# Patient Record
Sex: MALE | Race: ASIAN | Employment: FULL TIME | URBAN - METROPOLITAN AREA
[De-identification: names, ages, dates, MRNs, and addresses within clinical notes are randomized per-mention and may not be internally consistent; named-entity substitution may affect disease eponyms.]

---

## 2019-01-01 ENCOUNTER — APPOINTMENT (OUTPATIENT)
Dept: NON INVASIVE DIAGNOSTICS | Age: 64
DRG: 208 | End: 2019-01-01
Attending: INTERNAL MEDICINE
Payer: COMMERCIAL

## 2019-01-01 ENCOUNTER — APPOINTMENT (OUTPATIENT)
Dept: GENERAL RADIOLOGY | Age: 64
DRG: 208 | End: 2019-01-01
Attending: INTERNAL MEDICINE
Payer: COMMERCIAL

## 2019-01-01 ENCOUNTER — APPOINTMENT (OUTPATIENT)
Dept: GENERAL RADIOLOGY | Age: 64
DRG: 208 | End: 2019-01-01
Attending: EMERGENCY MEDICINE
Payer: COMMERCIAL

## 2019-01-01 ENCOUNTER — HOSPITAL ENCOUNTER (INPATIENT)
Age: 64
LOS: 2 days | DRG: 208 | End: 2019-06-22
Attending: EMERGENCY MEDICINE | Admitting: HOSPITALIST
Payer: COMMERCIAL

## 2019-01-01 ENCOUNTER — APPOINTMENT (OUTPATIENT)
Dept: CT IMAGING | Age: 64
DRG: 208 | End: 2019-01-01
Attending: EMERGENCY MEDICINE
Payer: COMMERCIAL

## 2019-01-01 VITALS
BODY MASS INDEX: 32.76 KG/M2 | HEIGHT: 66 IN | WEIGHT: 203.84 LBS | DIASTOLIC BLOOD PRESSURE: 54 MMHG | SYSTOLIC BLOOD PRESSURE: 74 MMHG | OXYGEN SATURATION: 94 % | TEMPERATURE: 95.5 F

## 2019-01-01 DIAGNOSIS — Z79.01 ANTICOAGULATED: ICD-10-CM

## 2019-01-01 DIAGNOSIS — R57.9 SHOCK CIRCULATORY (HCC): ICD-10-CM

## 2019-01-01 DIAGNOSIS — K92.1 GASTROINTESTINAL HEMORRHAGE WITH MELENA: ICD-10-CM

## 2019-01-01 DIAGNOSIS — D64.9 SEVERE ANEMIA: ICD-10-CM

## 2019-01-01 DIAGNOSIS — D68.9 COAGULOPATHY (HCC): ICD-10-CM

## 2019-01-01 DIAGNOSIS — I21.3: Primary | ICD-10-CM

## 2019-01-01 LAB
ABO + RH BLD: NORMAL
ALBUMIN SERPL-MCNC: 1.8 G/DL (ref 3.4–5)
ALBUMIN SERPL-MCNC: 2.7 G/DL (ref 3.4–5)
ALBUMIN SERPL-MCNC: 3.4 G/DL (ref 3.4–5)
ALBUMIN/GLOB SERPL: 1.1 {RATIO} (ref 0.8–1.7)
ALBUMIN/GLOB SERPL: 1.2 {RATIO} (ref 0.8–1.7)
ALBUMIN/GLOB SERPL: 1.3 {RATIO} (ref 0.8–1.7)
ALP SERPL-CCNC: 37 U/L (ref 45–117)
ALP SERPL-CCNC: 44 U/L (ref 45–117)
ALP SERPL-CCNC: 65 U/L (ref 45–117)
ALT SERPL-CCNC: 27 U/L (ref 16–61)
ALT SERPL-CCNC: 4841 U/L (ref 16–61)
ALT SERPL-CCNC: 997 U/L (ref 16–61)
ANION GAP SERPL CALC-SCNC: 18 MMOL/L (ref 3–18)
ANION GAP SERPL CALC-SCNC: 24 MMOL/L (ref 3–18)
ANION GAP SERPL CALC-SCNC: 26 MMOL/L (ref 3–18)
APPEARANCE UR: CLEAR
APTT PPP: 49.6 SEC (ref 23–36.4)
APTT PPP: 53.1 SEC (ref 23–36.4)
APTT PPP: 58.5 SEC (ref 23–36.4)
ARTERIAL PATENCY WRIST A: ABNORMAL
ARTERIAL PATENCY WRIST A: NO
ARTERIAL PATENCY WRIST A: NO
ARTERIAL PATENCY WRIST A: YES
AST SERPL-CCNC: 1178 U/L (ref 15–37)
AST SERPL-CCNC: 22 U/L (ref 15–37)
AST SERPL-CCNC: 9638 U/L (ref 15–37)
ATRIAL RATE: 119 BPM
ATRIAL RATE: 123 BPM
ATRIAL RATE: 97 BPM
BASE DEFICIT BLD-SCNC: 17 MMOL/L
BASE DEFICIT BLD-SCNC: 17 MMOL/L
BASE DEFICIT BLD-SCNC: 19 MMOL/L
BASE DEFICIT BLD-SCNC: 21 MMOL/L
BASE DEFICIT BLD-SCNC: 24 MMOL/L
BASE DEFICIT BLD-SCNC: 26 MMOL/L
BASOPHILS # BLD: 0 K/UL (ref 0–0.1)
BASOPHILS NFR BLD: 0 % (ref 0–2)
BASOPHILS NFR BLD: 0 % (ref 0–2)
BASOPHILS NFR BLD: 0 % (ref 0–3)
BASOPHILS NFR BLD: 0 % (ref 0–3)
BDY SITE: ABNORMAL
BILIRUB DIRECT SERPL-MCNC: 0.3 MG/DL (ref 0–0.2)
BILIRUB SERPL-MCNC: 0.3 MG/DL (ref 0.2–1)
BILIRUB SERPL-MCNC: 0.4 MG/DL (ref 0.2–1)
BILIRUB SERPL-MCNC: 0.8 MG/DL (ref 0.2–1)
BILIRUB UR QL: NEGATIVE
BLD PROD TYP BPU: NORMAL
BLOOD GROUP ANTIBODIES SERPL: NORMAL
BNP SERPL-MCNC: 539 PG/ML (ref 0–900)
BODY TEMPERATURE: 36.6
BODY TEMPERATURE: 38
BODY TEMPERATURE: 95.7
BPU ID: NORMAL
BUN SERPL-MCNC: 43 MG/DL (ref 7–18)
BUN SERPL-MCNC: 50 MG/DL (ref 7–18)
BUN SERPL-MCNC: 55 MG/DL (ref 7–18)
BUN/CREAT SERPL: 15 (ref 12–20)
BUN/CREAT SERPL: 26 (ref 12–20)
BUN/CREAT SERPL: 41 (ref 12–20)
CA-I SERPL-SCNC: 0.81 MMOL/L (ref 1.12–1.32)
CA-I SERPL-SCNC: 0.85 MMOL/L (ref 1.12–1.32)
CALCIUM SERPL-MCNC: 6 MG/DL (ref 8.5–10.1)
CALCIUM SERPL-MCNC: 6.7 MG/DL (ref 8.5–10.1)
CALCIUM SERPL-MCNC: 8.4 MG/DL (ref 8.5–10.1)
CALCULATED P AXIS, ECG09: 70 DEGREES
CALCULATED R AXIS, ECG10: 59 DEGREES
CALCULATED R AXIS, ECG10: 63 DEGREES
CALCULATED R AXIS, ECG10: 71 DEGREES
CALCULATED T AXIS, ECG11: -109 DEGREES
CALCULATED T AXIS, ECG11: -121 DEGREES
CALCULATED T AXIS, ECG11: -99 DEGREES
CALLED TO:,BCALL1: NORMAL
CHLORIDE SERPL-SCNC: 101 MMOL/L (ref 100–108)
CHLORIDE SERPL-SCNC: 107 MMOL/L (ref 100–108)
CHLORIDE SERPL-SCNC: 99 MMOL/L (ref 100–108)
CK MB CFR SERPL CALC: 10.8 % (ref 0–4)
CK MB CFR SERPL CALC: 5 % (ref 0–4)
CK MB CFR SERPL CALC: 6.9 % (ref 0–4)
CK MB CFR SERPL CALC: 7.1 % (ref 0–4)
CK MB SERPL-MCNC: 30.8 NG/ML (ref 5–25)
CK MB SERPL-MCNC: 4 NG/ML (ref 5–25)
CK MB SERPL-MCNC: 442.7 NG/ML (ref 5–25)
CK MB SERPL-MCNC: 7.7 NG/ML (ref 5–25)
CK SERPL-CCNC: 111 U/L (ref 39–308)
CK SERPL-CCNC: 4106 U/L (ref 39–308)
CK SERPL-CCNC: 432 U/L (ref 39–308)
CK SERPL-CCNC: 80 U/L (ref 39–308)
CO2 SERPL-SCNC: 14 MMOL/L (ref 21–32)
CO2 SERPL-SCNC: 15 MMOL/L (ref 21–32)
CO2 SERPL-SCNC: 16 MMOL/L (ref 21–32)
COLOR UR: YELLOW
CREAT SERPL-MCNC: 1.21 MG/DL (ref 0.6–1.3)
CREAT SERPL-MCNC: 1.68 MG/DL (ref 0.6–1.3)
CREAT SERPL-MCNC: 3.78 MG/DL (ref 0.6–1.3)
CROSSMATCH RESULT,%XM: NORMAL
DIAGNOSIS, 93000: NORMAL
DIFFERENTIAL METHOD BLD: ABNORMAL
ECHO AO ASC DIAM: 2.3 CM
ECHO AO ROOT DIAM: 2.99 CM
ECHO AV AREA PEAK VELOCITY: 1.8 CM2
ECHO AV AREA VTI: 1.6 CM2
ECHO AV AREA/BSA PEAK VELOCITY: 1 CM2/M2
ECHO AV AREA/BSA VTI: 0.9 CM2/M2
ECHO AV MEAN GRADIENT: 2.9 MMHG
ECHO AV PEAK GRADIENT: 5 MMHG
ECHO AV PEAK VELOCITY: 111.82 CM/S
ECHO AV VTI: 18.12 CM
ECHO IVC PROX: 1.79 CM
ECHO LA MAJOR AXIS: 4.73 CM
ECHO LA VOL 2C: 42.34 ML (ref 18–58)
ECHO LA VOL 4C: 50.53 ML (ref 18–58)
ECHO LA VOL BP: 45.53 ML (ref 18–58)
ECHO LA VOL/BSA BIPLANE: 23.01 ML/M2 (ref 16–28)
ECHO LA VOLUME INDEX A2C: 21.4 ML/M2 (ref 16–28)
ECHO LA VOLUME INDEX A4C: 25.54 ML/M2 (ref 16–28)
ECHO LV E' LATERAL VELOCITY: 5 CM/S
ECHO LV E' SEPTAL VELOCITY: 4 CM/S
ECHO LV EDV A2C: 110.6 ML
ECHO LV EDV A2C: 86.2 ML
ECHO LV EDV A4C: 80.7 ML
ECHO LV EDV A4C: 84 ML
ECHO LV EDV BP: 86.1 ML (ref 67–155)
ECHO LV EDV BP: 93.8 ML (ref 67–155)
ECHO LV EDV INDEX A4C: 40.8 ML/M2
ECHO LV EDV INDEX A4C: 42.5 ML/M2
ECHO LV EDV INDEX BP: 43.5 ML/M2
ECHO LV EDV INDEX BP: 47.4 ML/M2
ECHO LV EDV NDEX A2C: 43.6 ML/M2
ECHO LV EDV NDEX A2C: 55.9 ML/M2
ECHO LV EJECTION FRACTION A2C: 26 %
ECHO LV EJECTION FRACTION A2C: 44 %
ECHO LV EJECTION FRACTION A4C: 25 %
ECHO LV EJECTION FRACTION A4C: 43 %
ECHO LV EJECTION FRACTION BIPLANE: 26.8 % (ref 55–100)
ECHO LV EJECTION FRACTION BIPLANE: 43.8 % (ref 55–100)
ECHO LV ESV A2C: 61.8 ML
ECHO LV ESV A2C: 64.1 ML
ECHO LV ESV A4C: 45.9 ML
ECHO LV ESV A4C: 63.3 ML
ECHO LV ESV BP: 52.7 ML (ref 22–58)
ECHO LV ESV BP: 63 ML (ref 22–58)
ECHO LV ESV INDEX A2C: 31.2 ML/M2
ECHO LV ESV INDEX A2C: 32.4 ML/M2
ECHO LV ESV INDEX A4C: 23.2 ML/M2
ECHO LV ESV INDEX A4C: 32 ML/M2
ECHO LV ESV INDEX BP: 26.6 ML/M2
ECHO LV ESV INDEX BP: 31.8 ML/M2
ECHO LV INTERNAL DIMENSION DIASTOLIC: 4.21 CM (ref 4.2–5.9)
ECHO LV INTERNAL DIMENSION DIASTOLIC: 4.43 CM (ref 4.2–5.9)
ECHO LV INTERNAL DIMENSION SYSTOLIC: 3.49 CM
ECHO LV INTERNAL DIMENSION SYSTOLIC: 3.62 CM
ECHO LV IVSD: 1.23 CM (ref 0.6–1)
ECHO LV IVSD: 1.33 CM (ref 0.6–1)
ECHO LV MASS 2D: 227.6 G (ref 88–224)
ECHO LV MASS 2D: 229.1 G (ref 88–224)
ECHO LV MASS INDEX 2D: 115 G/M2 (ref 49–115)
ECHO LV MASS INDEX 2D: 115.8 G/M2 (ref 49–115)
ECHO LV POSTERIOR WALL DIASTOLIC: 1.07 CM (ref 0.6–1)
ECHO LV POSTERIOR WALL DIASTOLIC: 1.31 CM (ref 0.6–1)
ECHO LVOT DIAM: 1.94 CM
ECHO LVOT PEAK GRADIENT: 1.9 MMHG
ECHO LVOT PEAK VELOCITY: 68.72 CM/S
ECHO LVOT VTI: 9.84 CM
ECHO MV A VELOCITY: 66.53 CM/S
ECHO MV AREA PHT: 3.2 CM2
ECHO MV E DECELERATION TIME (DT): 240.8 MS
ECHO MV E VELOCITY: 39.31 CM/S
ECHO MV E/A RATIO: 0.59
ECHO MV E/E' LATERAL: 7.86
ECHO MV E/E' RATIO (AVERAGED): 8.84
ECHO MV E/E' SEPTAL: 9.83
ECHO MV PRESSURE HALF TIME (PHT): 69.8 MS
ECHO PULMONARY ARTERY SYSTOLIC PRESSURE (PASP): 30 MMHG
ECHO RA AREA 4C: 11.95 CM2
ECHO RV INTERNAL DIMENSION: 3.19 CM
ECHO TRICUSPID ANNULAR PEAK SYSTOLIC VELOCITY: 7 CM/S
ECHO TV REGURGITANT MAX VELOCITY: 170.66 CM/S
ECHO TV REGURGITANT PEAK GRADIENT: 11.6 MMHG
EOSINOPHIL # BLD: 0 K/UL (ref 0–0.4)
EOSINOPHIL NFR BLD: 0 % (ref 0–5)
ERYTHROCYTE [DISTWIDTH] IN BLOOD BY AUTOMATED COUNT: 16.2 % (ref 11.6–14.5)
ERYTHROCYTE [DISTWIDTH] IN BLOOD BY AUTOMATED COUNT: 16.3 % (ref 11.6–14.5)
ERYTHROCYTE [DISTWIDTH] IN BLOOD BY AUTOMATED COUNT: 16.6 % (ref 11.6–14.5)
ERYTHROCYTE [DISTWIDTH] IN BLOOD BY AUTOMATED COUNT: 16.7 % (ref 11.6–14.5)
EST. AVERAGE GLUCOSE BLD GHB EST-MCNC: 128 MG/DL
GAS FLOW.O2 O2 DELIVERY SYS: ABNORMAL L/MIN
GAS FLOW.O2 SETTING OXYMISER: 14 BPM
GAS FLOW.O2 SETTING OXYMISER: 20 BPM
GAS FLOW.O2 SETTING OXYMISER: 24 BPM
GLOBULIN SER CALC-MCNC: 1.7 G/DL (ref 2–4)
GLOBULIN SER CALC-MCNC: 2.1 G/DL (ref 2–4)
GLOBULIN SER CALC-MCNC: 2.8 G/DL (ref 2–4)
GLUCOSE BLD STRIP.AUTO-MCNC: 360 MG/DL (ref 70–110)
GLUCOSE BLD STRIP.AUTO-MCNC: 414 MG/DL (ref 70–110)
GLUCOSE BLD STRIP.AUTO-MCNC: 423 MG/DL (ref 70–110)
GLUCOSE BLD STRIP.AUTO-MCNC: 503 MG/DL (ref 70–110)
GLUCOSE SERPL-MCNC: 234 MG/DL (ref 74–99)
GLUCOSE SERPL-MCNC: 289 MG/DL (ref 74–99)
GLUCOSE SERPL-MCNC: 426 MG/DL (ref 74–99)
GLUCOSE UR STRIP.AUTO-MCNC: >1000 MG/DL
H PYLORI IGA SER-ACNC: 14.4 UNITS (ref 0–8.9)
H PYLORI IGG SER IA-ACNC: <0.8 INDEX VALUE (ref 0–0.79)
HBA1C MFR BLD: 6.1 % (ref 4.2–5.6)
HCO3 BLD-SCNC: 10.3 MMOL/L (ref 22–26)
HCO3 BLD-SCNC: 10.3 MMOL/L (ref 22–26)
HCO3 BLD-SCNC: 11.6 MMOL/L (ref 22–26)
HCO3 BLD-SCNC: 12.3 MMOL/L (ref 22–26)
HCO3 BLD-SCNC: 6.3 MMOL/L (ref 22–26)
HCO3 BLD-SCNC: 8.3 MMOL/L (ref 22–26)
HCT VFR BLD AUTO: 20.6 % (ref 36–48)
HCT VFR BLD AUTO: 25.3 % (ref 36–48)
HCT VFR BLD AUTO: 30.6 % (ref 36–48)
HCT VFR BLD AUTO: 30.7 % (ref 36–48)
HCT VFR BLD AUTO: 31.9 % (ref 36–48)
HCT VFR BLD AUTO: 35 % (ref 36–48)
HEMOCCULT STL QL: POSITIVE
HGB BLD-MCNC: 10.3 G/DL (ref 13–16)
HGB BLD-MCNC: 11.4 G/DL (ref 13–16)
HGB BLD-MCNC: 6.4 G/DL (ref 13–16)
HGB BLD-MCNC: 8 G/DL (ref 13–16)
HGB BLD-MCNC: 9.8 G/DL (ref 13–16)
HGB BLD-MCNC: 9.9 G/DL (ref 13–16)
HGB UR QL STRIP: NEGATIVE
INR PPP: 1.7 (ref 0.8–1.2)
INR PPP: 2.7 (ref 0.8–1.2)
INR PPP: 3 (ref 0.8–1.2)
INSPIRATION.DURATION SETTING TIME VENT: 1.1 SEC
INSPIRATION.DURATION SETTING TIME VENT: 1.2 SEC
INSPIRATION.DURATION SETTING TIME VENT: 1.5 SEC
KETONES UR QL STRIP.AUTO: 15 MG/DL
LACTATE BLD-SCNC: 4.1 MMOL/L (ref 0.4–2)
LACTATE SERPL-SCNC: 15.3 MMOL/L (ref 0.4–2)
LACTATE SERPL-SCNC: 17 MMOL/L (ref 0.4–2)
LACTATE SERPL-SCNC: 17.9 MMOL/L (ref 0.4–2)
LEUKOCYTE ESTERASE UR QL STRIP.AUTO: NEGATIVE
LYMPHOCYTES # BLD: 1.3 K/UL (ref 0.8–3.5)
LYMPHOCYTES # BLD: 3 K/UL (ref 0.9–3.6)
LYMPHOCYTES # BLD: 3.2 K/UL (ref 0.8–3.5)
LYMPHOCYTES # BLD: 3.8 K/UL (ref 0.9–3.6)
LYMPHOCYTES NFR BLD: 14 % (ref 21–52)
LYMPHOCYTES NFR BLD: 16 % (ref 20–51)
LYMPHOCYTES NFR BLD: 18 % (ref 21–52)
LYMPHOCYTES NFR BLD: 6 % (ref 20–51)
MAGNESIUM SERPL-MCNC: 2.4 MG/DL (ref 1.6–2.6)
MCH RBC QN AUTO: 26.8 PG (ref 24–34)
MCH RBC QN AUTO: 27.1 PG (ref 24–34)
MCH RBC QN AUTO: 29.4 PG (ref 24–34)
MCH RBC QN AUTO: 29.5 PG (ref 24–34)
MCHC RBC AUTO-ENTMCNC: 31.1 G/DL (ref 31–37)
MCHC RBC AUTO-ENTMCNC: 31.6 G/DL (ref 31–37)
MCHC RBC AUTO-ENTMCNC: 32.2 G/DL (ref 31–37)
MCHC RBC AUTO-ENTMCNC: 32.3 G/DL (ref 31–37)
MCV RBC AUTO: 84.6 FL (ref 74–97)
MCV RBC AUTO: 87.3 FL (ref 74–97)
MCV RBC AUTO: 91.1 FL (ref 74–97)
MCV RBC AUTO: 91.4 FL (ref 74–97)
METAMYELOCYTES NFR BLD MANUAL: 1 %
MONOCYTES # BLD: 0.6 K/UL (ref 0–1)
MONOCYTES # BLD: 0.8 K/UL (ref 0.05–1.2)
MONOCYTES # BLD: 0.8 K/UL (ref 0–1)
MONOCYTES # BLD: 1.1 K/UL (ref 0.05–1.2)
MONOCYTES NFR BLD: 3 % (ref 2–9)
MONOCYTES NFR BLD: 4 % (ref 2–9)
MONOCYTES NFR BLD: 4 % (ref 3–10)
MONOCYTES NFR BLD: 5 % (ref 3–10)
MYELOCYTES NFR BLD MANUAL: 4 %
NEUTS BAND NFR BLD MANUAL: 13 % (ref 0–5)
NEUTS SEG # BLD: 15.2 K/UL (ref 1.8–8)
NEUTS SEG # BLD: 16.8 K/UL (ref 1.8–8)
NEUTS SEG # BLD: 16.8 K/UL (ref 1.8–8)
NEUTS SEG # BLD: 16.9 K/UL (ref 1.8–8)
NEUTS SEG NFR BLD: 75 % (ref 42–75)
NEUTS SEG NFR BLD: 78 % (ref 40–73)
NEUTS SEG NFR BLD: 78 % (ref 42–75)
NEUTS SEG NFR BLD: 81 % (ref 40–73)
NITRITE UR QL STRIP.AUTO: NEGATIVE
NRBC BLD-RTO: 3 PER 100 WBC
O2/TOTAL GAS SETTING VFR VENT: 100 %
O2/TOTAL GAS SETTING VFR VENT: 40 %
P-R INTERVAL, ECG05: 152 MS
P-R INTERVAL, ECG05: 186 MS
PCO2 BLD: 28.7 MMHG (ref 35–45)
PCO2 BLD: 32.8 MMHG (ref 35–45)
PCO2 BLD: 34.5 MMHG (ref 35–45)
PCO2 BLD: 38 MMHG (ref 35–45)
PCO2 BLD: 38.6 MMHG (ref 35–45)
PCO2 BLD: 41.8 MMHG (ref 35–45)
PEEP RESPIRATORY: 5 CMH2O
PEEP RESPIRATORY: 8 CMH2O
PH BLD: 6.91 [PH] (ref 7.35–7.45)
PH BLD: 6.95 [PH] (ref 7.35–7.45)
PH BLD: 7.03 [PH] (ref 7.35–7.45)
PH BLD: 7.1 [PH] (ref 7.35–7.45)
PH BLD: 7.12 [PH] (ref 7.35–7.45)
PH BLD: 7.14 [PH] (ref 7.35–7.45)
PH UR STRIP: 5 [PH] (ref 5–8)
PHOSPHATE SERPL-MCNC: 9 MG/DL (ref 2.5–4.9)
PHOSPHATE SERPL-MCNC: 9.3 MG/DL (ref 2.5–4.9)
PLATELET # BLD AUTO: 206 K/UL (ref 135–420)
PLATELET # BLD AUTO: 259 K/UL (ref 135–420)
PLATELET # BLD AUTO: 64 K/UL (ref 135–420)
PLATELET # BLD AUTO: 72 K/UL (ref 135–420)
PLATELET COMMENTS,PCOM: ABNORMAL
PLATELET COMMENTS,PCOM: ABNORMAL
PMV BLD AUTO: 11.9 FL (ref 9.2–11.8)
PMV BLD AUTO: 12.3 FL (ref 9.2–11.8)
PMV BLD AUTO: 12.4 FL (ref 9.2–11.8)
PMV BLD AUTO: 13 FL (ref 9.2–11.8)
PO2 BLD: 100 MMHG (ref 80–100)
PO2 BLD: 118 MMHG (ref 80–100)
PO2 BLD: 179 MMHG (ref 80–100)
PO2 BLD: 69 MMHG (ref 80–100)
PO2 BLD: 80 MMHG (ref 80–100)
PO2 BLD: 98 MMHG (ref 80–100)
POTASSIUM SERPL-SCNC: 4.2 MMOL/L (ref 3.5–5.5)
POTASSIUM SERPL-SCNC: 4.7 MMOL/L (ref 3.5–5.5)
POTASSIUM SERPL-SCNC: 4.8 MMOL/L (ref 3.5–5.5)
PROT SERPL-MCNC: 3.5 G/DL (ref 6.4–8.2)
PROT SERPL-MCNC: 4.8 G/DL (ref 6.4–8.2)
PROT SERPL-MCNC: 6.2 G/DL (ref 6.4–8.2)
PROT UR STRIP-MCNC: NEGATIVE MG/DL
PROTHROMBIN TIME: 20 SEC (ref 11.5–15.2)
PROTHROMBIN TIME: 29.2 SEC (ref 11.5–15.2)
PROTHROMBIN TIME: 31.8 SEC (ref 11.5–15.2)
Q-T INTERVAL, ECG07: 322 MS
Q-T INTERVAL, ECG07: 344 MS
Q-T INTERVAL, ECG07: 360 MS
QRS DURATION, ECG06: 104 MS
QRS DURATION, ECG06: 72 MS
QRS DURATION, ECG06: 82 MS
QTC CALCULATION (BEZET), ECG08: 457 MS
QTC CALCULATION (BEZET), ECG08: 462 MS
QTC CALCULATION (BEZET), ECG08: 492 MS
RBC # BLD AUTO: 2.36 M/UL (ref 4.7–5.5)
RBC # BLD AUTO: 2.99 M/UL (ref 4.7–5.5)
RBC # BLD AUTO: 3.36 M/UL (ref 4.7–5.5)
RBC # BLD AUTO: 3.5 M/UL (ref 4.7–5.5)
RBC MORPH BLD: ABNORMAL
SAO2 % BLD: 86 % (ref 92–97)
SAO2 % BLD: 91 % (ref 92–97)
SAO2 % BLD: 92 % (ref 92–97)
SAO2 % BLD: 95 % (ref 92–97)
SAO2 % BLD: 95 % (ref 92–97)
SAO2 % BLD: 99 % (ref 92–97)
SERVICE CMNT-IMP: ABNORMAL
SODIUM SERPL-SCNC: 135 MMOL/L (ref 136–145)
SODIUM SERPL-SCNC: 139 MMOL/L (ref 136–145)
SODIUM SERPL-SCNC: 146 MMOL/L (ref 136–145)
SP GR UR REFRACTOMETRY: 1.03 (ref 1–1.03)
SPECIMEN EXP DATE BLD: NORMAL
SPECIMEN TYPE: ABNORMAL
STATUS OF UNIT,%ST: NORMAL
TOTAL RESP. RATE, ITRR: 18
TOTAL RESP. RATE, ITRR: 24
TOTAL RESP. RATE, ITRR: 24
TROPONIN I SERPL-MCNC: 0.21 NG/ML (ref 0–0.04)
TROPONIN I SERPL-MCNC: 0.75 NG/ML (ref 0–0.04)
TROPONIN I SERPL-MCNC: 11.1 NG/ML (ref 0–0.04)
TROPONIN I SERPL-MCNC: >200 NG/ML (ref 0–0.04)
UNIT DIVISION, %UDIV: 0
UROBILINOGEN UR QL STRIP.AUTO: 0.2 EU/DL (ref 0.2–1)
VENTILATION MODE VENT: ABNORMAL
VENTRICULAR RATE, ECG03: 123 BPM
VENTRICULAR RATE, ECG03: 124 BPM
VENTRICULAR RATE, ECG03: 97 BPM
VOLUME CONTROL IVLC: YES
VOLUME CONTROL PLUS IVLCP: YES
VOLUME CONTROL PLUS IVLCP: YES
VT SETTING VENT: 450 ML
WBC # BLD AUTO: 20.3 K/UL (ref 4.6–13.2)
WBC # BLD AUTO: 21 K/UL (ref 4.6–13.2)
WBC # BLD AUTO: 21.5 K/UL (ref 4.6–13.2)
WBC # BLD AUTO: 21.6 K/UL (ref 4.6–13.2)

## 2019-01-01 PROCEDURE — 96368 THER/DIAG CONCURRENT INF: CPT

## 2019-01-01 PROCEDURE — 94400 HC END TIDAL CO2 RESPONSE CURVE: CPT

## 2019-01-01 PROCEDURE — 04HK33Z INSERTION OF INFUSION DEVICE INTO RIGHT FEMORAL ARTERY, PERCUTANEOUS APPROACH: ICD-10-PCS | Performed by: INTERNAL MEDICINE

## 2019-01-01 PROCEDURE — C1751 CATH, INF, PER/CENT/MIDLINE: HCPCS

## 2019-01-01 PROCEDURE — 86923 COMPATIBILITY TEST ELECTRIC: CPT

## 2019-01-01 PROCEDURE — P9059 PLASMA, FRZ BETWEEN 8-24HOUR: HCPCS

## 2019-01-01 PROCEDURE — 77030034850

## 2019-01-01 PROCEDURE — 36592 COLLECT BLOOD FROM PICC: CPT

## 2019-01-01 PROCEDURE — 74011000258 HC RX REV CODE- 258: Performed by: EMERGENCY MEDICINE

## 2019-01-01 PROCEDURE — 30233N1 TRANSFUSION OF NONAUTOLOGOUS RED BLOOD CELLS INTO PERIPHERAL VEIN, PERCUTANEOUS APPROACH: ICD-10-PCS | Performed by: HOSPITALIST

## 2019-01-01 PROCEDURE — 74011636320 HC RX REV CODE- 636/320: Performed by: EMERGENCY MEDICINE

## 2019-01-01 PROCEDURE — 94002 VENT MGMT INPAT INIT DAY: CPT

## 2019-01-01 PROCEDURE — 80048 BASIC METABOLIC PNL TOTAL CA: CPT

## 2019-01-01 PROCEDURE — 74011000250 HC RX REV CODE- 250: Performed by: INTERNAL MEDICINE

## 2019-01-01 PROCEDURE — 85610 PROTHROMBIN TIME: CPT

## 2019-01-01 PROCEDURE — 74011000258 HC RX REV CODE- 258: Performed by: INTERNAL MEDICINE

## 2019-01-01 PROCEDURE — 36620 INSERTION CATHETER ARTERY: CPT

## 2019-01-01 PROCEDURE — 71045 X-RAY EXAM CHEST 1 VIEW: CPT

## 2019-01-01 PROCEDURE — 36600 WITHDRAWAL OF ARTERIAL BLOOD: CPT

## 2019-01-01 PROCEDURE — 77030005402 HC CATH RAD ART LN KT TELE -B

## 2019-01-01 PROCEDURE — 74011250636 HC RX REV CODE- 250/636

## 2019-01-01 PROCEDURE — 82962 GLUCOSE BLOOD TEST: CPT

## 2019-01-01 PROCEDURE — 80053 COMPREHEN METABOLIC PANEL: CPT

## 2019-01-01 PROCEDURE — 74011250636 HC RX REV CODE- 250/636: Performed by: EMERGENCY MEDICINE

## 2019-01-01 PROCEDURE — 30233K1 TRANSFUSION OF NONAUTOLOGOUS FROZEN PLASMA INTO PERIPHERAL VEIN, PERCUTANEOUS APPROACH: ICD-10-PCS | Performed by: HOSPITALIST

## 2019-01-01 PROCEDURE — P9047 ALBUMIN (HUMAN), 25%, 50ML: HCPCS | Performed by: INTERNAL MEDICINE

## 2019-01-01 PROCEDURE — 86900 BLOOD TYPING SEROLOGIC ABO: CPT

## 2019-01-01 PROCEDURE — 74011000636 HC RX REV CODE- 636: Performed by: EMERGENCY MEDICINE

## 2019-01-01 PROCEDURE — 77010033678 HC OXYGEN DAILY

## 2019-01-01 PROCEDURE — 81003 URINALYSIS AUTO W/O SCOPE: CPT

## 2019-01-01 PROCEDURE — 82803 BLOOD GASES ANY COMBINATION: CPT

## 2019-01-01 PROCEDURE — 80076 HEPATIC FUNCTION PANEL: CPT

## 2019-01-01 PROCEDURE — 74011250636 HC RX REV CODE- 250/636: Performed by: INTERNAL MEDICINE

## 2019-01-01 PROCEDURE — 65610000006 HC RM INTENSIVE CARE

## 2019-01-01 PROCEDURE — 31500 INSERT EMERGENCY AIRWAY: CPT

## 2019-01-01 PROCEDURE — 95816 EEG AWAKE AND DROWSY: CPT | Performed by: INTERNAL MEDICINE

## 2019-01-01 PROCEDURE — P9016 RBC LEUKOCYTES REDUCED: HCPCS

## 2019-01-01 PROCEDURE — 93005 ELECTROCARDIOGRAM TRACING: CPT

## 2019-01-01 PROCEDURE — 74011000250 HC RX REV CODE- 250: Performed by: EMERGENCY MEDICINE

## 2019-01-01 PROCEDURE — 93306 TTE W/DOPPLER COMPLETE: CPT

## 2019-01-01 PROCEDURE — 5A1945Z RESPIRATORY VENTILATION, 24-96 CONSECUTIVE HOURS: ICD-10-PCS | Performed by: EMERGENCY MEDICINE

## 2019-01-01 PROCEDURE — 87077 CULTURE AEROBIC IDENTIFY: CPT

## 2019-01-01 PROCEDURE — 84100 ASSAY OF PHOSPHORUS: CPT

## 2019-01-01 PROCEDURE — 96375 TX/PRO/DX INJ NEW DRUG ADDON: CPT

## 2019-01-01 PROCEDURE — 36556 INSERT NON-TUNNEL CV CATH: CPT

## 2019-01-01 PROCEDURE — 77030020454 HC TU ET CUF HI/LO COVD -B

## 2019-01-01 PROCEDURE — 87040 BLOOD CULTURE FOR BACTERIA: CPT

## 2019-01-01 PROCEDURE — 74177 CT ABD & PELVIS W/CONTRAST: CPT

## 2019-01-01 PROCEDURE — 83605 ASSAY OF LACTIC ACID: CPT

## 2019-01-01 PROCEDURE — C9113 INJ PANTOPRAZOLE SODIUM, VIA: HCPCS | Performed by: INTERNAL MEDICINE

## 2019-01-01 PROCEDURE — 36430 TRANSFUSION BLD/BLD COMPNT: CPT

## 2019-01-01 PROCEDURE — 85025 COMPLETE CBC W/AUTO DIFF WBC: CPT

## 2019-01-01 PROCEDURE — 82272 OCCULT BLD FECES 1-3 TESTS: CPT

## 2019-01-01 PROCEDURE — 92950 HEART/LUNG RESUSCITATION CPR: CPT

## 2019-01-01 PROCEDURE — 99285 EMERGENCY DEPT VISIT HI MDM: CPT

## 2019-01-01 PROCEDURE — 85730 THROMBOPLASTIN TIME PARTIAL: CPT

## 2019-01-01 PROCEDURE — 74011250637 HC RX REV CODE- 250/637: Performed by: EMERGENCY MEDICINE

## 2019-01-01 PROCEDURE — 02HV33Z INSERTION OF INFUSION DEVICE INTO SUPERIOR VENA CAVA, PERCUTANEOUS APPROACH: ICD-10-PCS | Performed by: EMERGENCY MEDICINE

## 2019-01-01 PROCEDURE — C9132 KCENTRA, PER I.U.: HCPCS | Performed by: EMERGENCY MEDICINE

## 2019-01-01 PROCEDURE — 77030008477 HC STYL SATN SLP COVD -A

## 2019-01-01 PROCEDURE — 30283B1 TRANSFUSION OF NONAUTOLOGOUS 4-FACTOR PROTHROMBIN COMPLEX CONCENTRATE INTO VEIN, PERCUTANEOUS APPROACH: ICD-10-PCS | Performed by: EMERGENCY MEDICINE

## 2019-01-01 PROCEDURE — 87186 SC STD MICRODIL/AGAR DIL: CPT

## 2019-01-01 PROCEDURE — 76450000000

## 2019-01-01 PROCEDURE — 96366 THER/PROPH/DIAG IV INF ADDON: CPT

## 2019-01-01 PROCEDURE — 94003 VENT MGMT INPAT SUBQ DAY: CPT

## 2019-01-01 PROCEDURE — 74018 RADEX ABDOMEN 1 VIEW: CPT

## 2019-01-01 PROCEDURE — 85018 HEMOGLOBIN: CPT

## 2019-01-01 PROCEDURE — 86677 HELICOBACTER PYLORI ANTIBODY: CPT

## 2019-01-01 PROCEDURE — 83735 ASSAY OF MAGNESIUM: CPT

## 2019-01-01 PROCEDURE — C9113 INJ PANTOPRAZOLE SODIUM, VIA: HCPCS | Performed by: EMERGENCY MEDICINE

## 2019-01-01 PROCEDURE — 83036 HEMOGLOBIN GLYCOSYLATED A1C: CPT

## 2019-01-01 PROCEDURE — 0BH17EZ INSERTION OF ENDOTRACHEAL AIRWAY INTO TRACHEA, VIA NATURAL OR ARTIFICIAL OPENING: ICD-10-PCS | Performed by: EMERGENCY MEDICINE

## 2019-01-01 PROCEDURE — 82330 ASSAY OF CALCIUM: CPT

## 2019-01-01 PROCEDURE — 74011636637 HC RX REV CODE- 636/637: Performed by: INTERNAL MEDICINE

## 2019-01-01 PROCEDURE — 74011000250 HC RX REV CODE- 250

## 2019-01-01 PROCEDURE — 82550 ASSAY OF CK (CPK): CPT

## 2019-01-01 PROCEDURE — 83880 ASSAY OF NATRIURETIC PEPTIDE: CPT

## 2019-01-01 PROCEDURE — 93308 TTE F-UP OR LMTD: CPT

## 2019-01-01 PROCEDURE — 96365 THER/PROPH/DIAG IV INF INIT: CPT

## 2019-01-01 RX ORDER — DOPAMINE HYDROCHLORIDE 160 MG/100ML
INJECTION, SOLUTION INTRAVENOUS
Status: COMPLETED
Start: 2019-01-01 | End: 2019-01-01

## 2019-01-01 RX ORDER — NOREPINEPHRINE BITARTRATE/D5W 8 MG/250ML
2-16 PLASTIC BAG, INJECTION (ML) INTRAVENOUS
Status: DISCONTINUED | OUTPATIENT
Start: 2019-01-01 | End: 2019-01-01

## 2019-01-01 RX ORDER — SODIUM BICARBONATE 84 MG/ML
100 INJECTION, SOLUTION INTRAVENOUS ONCE
Status: DISPENSED | OUTPATIENT
Start: 2019-01-01 | End: 2019-01-01

## 2019-01-01 RX ORDER — SODIUM BICARBONATE 84 MG/ML
100 INJECTION, SOLUTION INTRAVENOUS ONCE
Status: COMPLETED | OUTPATIENT
Start: 2019-01-01 | End: 2019-01-01

## 2019-01-01 RX ORDER — DOPAMINE HYDROCHLORIDE 160 MG/100ML
5-20 INJECTION, SOLUTION INTRAVENOUS
Status: DISCONTINUED | OUTPATIENT
Start: 2019-01-01 | End: 2019-01-01 | Stop reason: HOSPADM

## 2019-01-01 RX ORDER — DEXTROSE MONOHYDRATE 100 MG/ML
125-250 INJECTION, SOLUTION INTRAVENOUS AS NEEDED
Status: DISCONTINUED | OUTPATIENT
Start: 2019-01-01 | End: 2019-01-01 | Stop reason: HOSPADM

## 2019-01-01 RX ORDER — SODIUM CHLORIDE 9 MG/ML
250 INJECTION, SOLUTION INTRAVENOUS AS NEEDED
Status: DISCONTINUED | OUTPATIENT
Start: 2019-01-01 | End: 2019-01-01 | Stop reason: HOSPADM

## 2019-01-01 RX ORDER — CHLORHEXIDINE GLUCONATE 1.2 MG/ML
10 RINSE ORAL EVERY 12 HOURS
Status: DISCONTINUED | OUTPATIENT
Start: 2019-01-01 | End: 2019-01-01 | Stop reason: HOSPADM

## 2019-01-01 RX ORDER — LORAZEPAM 2 MG/ML
2 INJECTION INTRAMUSCULAR ONCE
Status: COMPLETED | OUTPATIENT
Start: 2019-01-01 | End: 2019-01-01

## 2019-01-01 RX ORDER — NOREPINEPHRINE BITARTRATE 1 MG/ML
8 INJECTION, SOLUTION INTRAVENOUS
Status: DISCONTINUED | OUTPATIENT
Start: 2019-01-01 | End: 2019-01-01

## 2019-01-01 RX ORDER — MORPHINE SULFATE 2 MG/ML
2 INJECTION, SOLUTION INTRAMUSCULAR; INTRAVENOUS ONCE
Status: ACTIVE | OUTPATIENT
Start: 2019-01-01 | End: 2019-01-01

## 2019-01-01 RX ORDER — ETOMIDATE 2 MG/ML
INJECTION INTRAVENOUS
Status: COMPLETED | OUTPATIENT
Start: 2019-01-01 | End: 2019-01-01

## 2019-01-01 RX ORDER — PHYTONADIONE 10 MG/ML
2.5 INJECTION, EMULSION INTRAMUSCULAR; INTRAVENOUS; SUBCUTANEOUS ONCE
Status: ACTIVE | OUTPATIENT
Start: 2019-01-01 | End: 2019-01-01

## 2019-01-01 RX ORDER — HYDROCORTISONE SODIUM SUCCINATE 100 MG/2ML
50 INJECTION, POWDER, FOR SOLUTION INTRAMUSCULAR; INTRAVENOUS EVERY 6 HOURS
Status: DISCONTINUED | OUTPATIENT
Start: 2019-01-01 | End: 2019-01-01 | Stop reason: HOSPADM

## 2019-01-01 RX ORDER — SODIUM CHLORIDE 9 MG/ML
75 INJECTION, SOLUTION INTRAVENOUS CONTINUOUS
Status: DISCONTINUED | OUTPATIENT
Start: 2019-01-01 | End: 2019-01-01

## 2019-01-01 RX ORDER — FENTANYL CITRATE 50 UG/ML
50 INJECTION, SOLUTION INTRAMUSCULAR; INTRAVENOUS
Status: DISCONTINUED | OUTPATIENT
Start: 2019-01-01 | End: 2019-01-01 | Stop reason: HOSPADM

## 2019-01-01 RX ORDER — LORAZEPAM 2 MG/ML
1 INJECTION INTRAMUSCULAR ONCE
Status: COMPLETED | OUTPATIENT
Start: 2019-01-01 | End: 2019-01-01

## 2019-01-01 RX ORDER — LEVOFLOXACIN 5 MG/ML
750 INJECTION, SOLUTION INTRAVENOUS EVERY 24 HOURS
Status: DISCONTINUED | OUTPATIENT
Start: 2019-01-01 | End: 2019-01-01

## 2019-01-01 RX ORDER — SODIUM BICARBONATE 1 MEQ/ML
100 SYRINGE (ML) INTRAVENOUS ONCE
Status: COMPLETED | OUTPATIENT
Start: 2019-01-01 | End: 2019-01-01

## 2019-01-01 RX ORDER — MAGNESIUM SULFATE 100 %
4 CRYSTALS MISCELLANEOUS AS NEEDED
Status: DISCONTINUED | OUTPATIENT
Start: 2019-01-01 | End: 2019-01-01 | Stop reason: HOSPADM

## 2019-01-01 RX ORDER — MORPHINE SULFATE 2 MG/ML
2 INJECTION, SOLUTION INTRAMUSCULAR; INTRAVENOUS
Status: DISPENSED | OUTPATIENT
Start: 2019-01-01 | End: 2019-01-01

## 2019-01-01 RX ORDER — INSULIN LISPRO 100 [IU]/ML
INJECTION, SOLUTION INTRAVENOUS; SUBCUTANEOUS EVERY 6 HOURS
Status: DISCONTINUED | OUTPATIENT
Start: 2019-01-01 | End: 2019-01-01

## 2019-01-01 RX ORDER — EPINEPHRINE 0.1 MG/ML
INJECTION INTRACARDIAC; INTRAVENOUS
Status: COMPLETED | OUTPATIENT
Start: 2019-01-01 | End: 2019-01-01

## 2019-01-01 RX ORDER — ONDANSETRON 2 MG/ML
4 INJECTION INTRAMUSCULAR; INTRAVENOUS
Status: COMPLETED | OUTPATIENT
Start: 2019-01-01 | End: 2019-01-01

## 2019-01-01 RX ORDER — PANTOPRAZOLE SODIUM 40 MG/10ML
80 INJECTION, POWDER, LYOPHILIZED, FOR SOLUTION INTRAVENOUS
Status: COMPLETED | OUTPATIENT
Start: 2019-01-01 | End: 2019-01-01

## 2019-01-01 RX ORDER — SUCCINYLCHOLINE CHLORIDE 20 MG/ML
INJECTION INTRAMUSCULAR; INTRAVENOUS
Status: COMPLETED | OUTPATIENT
Start: 2019-01-01 | End: 2019-01-01

## 2019-01-01 RX ORDER — SODIUM BICARBONATE 1 MEQ/ML
SYRINGE (ML) INTRAVENOUS
Status: COMPLETED | OUTPATIENT
Start: 2019-01-01 | End: 2019-01-01

## 2019-01-01 RX ORDER — NITROGLYCERIN 0.4 MG/1
0.4 TABLET SUBLINGUAL AS NEEDED
Status: DISCONTINUED | OUTPATIENT
Start: 2019-01-01 | End: 2019-01-01 | Stop reason: HOSPADM

## 2019-01-01 RX ORDER — ALBUMIN HUMAN 250 G/1000ML
12.5 SOLUTION INTRAVENOUS EVERY 6 HOURS
Status: DISCONTINUED | OUTPATIENT
Start: 2019-01-01 | End: 2019-01-01 | Stop reason: HOSPADM

## 2019-01-01 RX ORDER — DEXTROSE MONOHYDRATE 100 MG/ML
100 INJECTION, SOLUTION INTRAVENOUS AS NEEDED
Status: DISCONTINUED | OUTPATIENT
Start: 2019-01-01 | End: 2019-01-01 | Stop reason: HOSPADM

## 2019-01-01 RX ORDER — ACETAMINOPHEN 325 MG/1
650 TABLET ORAL
Status: COMPLETED | OUTPATIENT
Start: 2019-01-01 | End: 2019-01-01

## 2019-01-01 RX ORDER — SODIUM CHLORIDE 0.9 % (FLUSH) 0.9 %
5-10 SYRINGE (ML) INJECTION AS NEEDED
Status: DISCONTINUED | OUTPATIENT
Start: 2019-01-01 | End: 2019-01-01 | Stop reason: HOSPADM

## 2019-01-01 RX ORDER — SODIUM BICARBONATE 1 MEQ/ML
SYRINGE (ML) INTRAVENOUS
Status: COMPLETED
Start: 2019-01-01 | End: 2019-01-01

## 2019-01-01 RX ORDER — FAMOTIDINE 10 MG/ML
20 INJECTION INTRAVENOUS
Status: COMPLETED | OUTPATIENT
Start: 2019-01-01 | End: 2019-01-01

## 2019-01-01 RX ORDER — VANCOMYCIN/0.9 % SOD CHLORIDE 1.5G/250ML
1500 PLASTIC BAG, INJECTION (ML) INTRAVENOUS ONCE
Status: COMPLETED | OUTPATIENT
Start: 2019-01-01 | End: 2019-01-01

## 2019-01-01 RX ORDER — HYDROCORTISONE SODIUM SUCCINATE 100 MG/2ML
100 INJECTION, POWDER, FOR SOLUTION INTRAMUSCULAR; INTRAVENOUS ONCE
Status: COMPLETED | OUTPATIENT
Start: 2019-01-01 | End: 2019-01-01

## 2019-01-01 RX ADMIN — SODIUM CHLORIDE 40 MG: 9 INJECTION, SOLUTION INTRAMUSCULAR; INTRAVENOUS; SUBCUTANEOUS at 10:50

## 2019-01-01 RX ADMIN — MEROPENEM 1 G: 1 INJECTION, POWDER, FOR SOLUTION INTRAVENOUS at 18:38

## 2019-01-01 RX ADMIN — ETOMIDATE 20 MG: 2 INJECTION, SOLUTION INTRAVENOUS at 07:13

## 2019-01-01 RX ADMIN — VANCOMYCIN HYDROCHLORIDE 1000 MG: 1 INJECTION, POWDER, LYOPHILIZED, FOR SOLUTION INTRAVENOUS at 19:06

## 2019-01-01 RX ADMIN — SODIUM BICARBONATE 50 MEQ: 84 INJECTION, SOLUTION INTRAVENOUS at 10:19

## 2019-01-01 RX ADMIN — IOPAMIDOL 100 ML: 612 INJECTION, SOLUTION INTRAVENOUS at 06:33

## 2019-01-01 RX ADMIN — DOPAMINE HYDROCHLORIDE 10 MCG/KG/MIN: 160 INJECTION, SOLUTION INTRAVENOUS at 10:45

## 2019-01-01 RX ADMIN — VANCOMYCIN HYDROCHLORIDE 1500 MG: 10 INJECTION, POWDER, LYOPHILIZED, FOR SOLUTION INTRAVENOUS at 05:36

## 2019-01-01 RX ADMIN — MEROPENEM 1 G: 1 INJECTION, POWDER, FOR SOLUTION INTRAVENOUS at 10:07

## 2019-01-01 RX ADMIN — HYDROCORTISONE SODIUM SUCCINATE 100 MG: 100 INJECTION, POWDER, FOR SOLUTION INTRAMUSCULAR; INTRAVENOUS at 10:26

## 2019-01-01 RX ADMIN — SODIUM BICARBONATE: 84 INJECTION, SOLUTION INTRAVENOUS at 11:08

## 2019-01-01 RX ADMIN — Medication 4425 INT'L UNITS: at 08:54

## 2019-01-01 RX ADMIN — ALBUMIN (HUMAN) 12.5 G: 0.25 INJECTION, SOLUTION INTRAVENOUS at 23:23

## 2019-01-01 RX ADMIN — PHENYLEPHRINE HYDROCHLORIDE 35 MCG/MIN: 10 INJECTION INTRAVENOUS at 09:53

## 2019-01-01 RX ADMIN — DOPAMINE HYDROCHLORIDE 20 MCG/KG/MIN: 160 INJECTION, SOLUTION INTRAVENOUS at 11:05

## 2019-01-01 RX ADMIN — PROMETHAZINE HYDROCHLORIDE 25 MG: 25 INJECTION, SOLUTION INTRAMUSCULAR; INTRAVENOUS at 05:54

## 2019-01-01 RX ADMIN — EPINEPHRINE 1 MG: 0.1 INJECTION, SOLUTION ENDOTRACHEAL; INTRACARDIAC; INTRAVENOUS at 10:26

## 2019-01-01 RX ADMIN — ONDANSETRON 4 MG: 2 INJECTION INTRAMUSCULAR; INTRAVENOUS at 03:58

## 2019-01-01 RX ADMIN — CALCIUM GLUCONATE 1 G: 94 INJECTION, SOLUTION INTRAVENOUS at 15:59

## 2019-01-01 RX ADMIN — SODIUM BICARBONATE 50 MEQ: 84 INJECTION INTRAVENOUS at 11:27

## 2019-01-01 RX ADMIN — PHENYLEPHRINE HYDROCHLORIDE 200 MCG/MIN: 10 INJECTION INTRAVENOUS at 21:30

## 2019-01-01 RX ADMIN — HYDROCORTISONE SODIUM SUCCINATE 50 MG: 100 INJECTION, POWDER, FOR SOLUTION INTRAMUSCULAR; INTRAVENOUS at 00:05

## 2019-01-01 RX ADMIN — SODIUM BICARBONATE 100 MEQ: 84 INJECTION, SOLUTION INTRAVENOUS at 09:17

## 2019-01-01 RX ADMIN — ALBUMIN (HUMAN) 12.5 G: 0.25 INJECTION, SOLUTION INTRAVENOUS at 19:19

## 2019-01-01 RX ADMIN — NOREPINEPHRINE BITARTRATE 4 MCG/MIN: 1 INJECTION, SOLUTION, CONCENTRATE INTRAVENOUS at 08:00

## 2019-01-01 RX ADMIN — LEVOFLOXACIN 750 MG: 5 INJECTION, SOLUTION INTRAVENOUS at 05:06

## 2019-01-01 RX ADMIN — PHENYLEPHRINE HYDROCHLORIDE 200 MCG/MIN: 10 INJECTION INTRAVENOUS at 15:14

## 2019-01-01 RX ADMIN — NOREPINEPHRINE BITARTRATE 10 MCG/MIN: 1 INJECTION, SOLUTION, CONCENTRATE INTRAVENOUS at 08:29

## 2019-01-01 RX ADMIN — EPINEPHRINE 1 MG: 0.1 INJECTION, SOLUTION ENDOTRACHEAL; INTRACARDIAC; INTRAVENOUS at 10:19

## 2019-01-01 RX ADMIN — SODIUM CHLORIDE 40 MG: 9 INJECTION, SOLUTION INTRAMUSCULAR; INTRAVENOUS; SUBCUTANEOUS at 10:06

## 2019-01-01 RX ADMIN — DOPAMINE HYDROCHLORIDE 20 MCG/KG/MIN: 160 INJECTION, SOLUTION INTRAVENOUS at 02:29

## 2019-01-01 RX ADMIN — EPINEPHRINE 1 MG: 0.1 INJECTION, SOLUTION ENDOTRACHEAL; INTRACARDIAC; INTRAVENOUS at 10:23

## 2019-01-01 RX ADMIN — HYDROCORTISONE SODIUM SUCCINATE 50 MG: 100 INJECTION, POWDER, FOR SOLUTION INTRAMUSCULAR; INTRAVENOUS at 10:50

## 2019-01-01 RX ADMIN — SODIUM BICARBONATE 50 MEQ: 84 INJECTION INTRAVENOUS at 11:30

## 2019-01-01 RX ADMIN — LORAZEPAM 2 MG: 2 INJECTION INTRAMUSCULAR; INTRAVENOUS at 08:19

## 2019-01-01 RX ADMIN — EPINEPHRINE 1 MG: 0.1 INJECTION, SOLUTION ENDOTRACHEAL; INTRACARDIAC; INTRAVENOUS at 10:16

## 2019-01-01 RX ADMIN — PHENYLEPHRINE HYDROCHLORIDE 200 MCG/MIN: 10 INJECTION INTRAVENOUS at 00:15

## 2019-01-01 RX ADMIN — PHENYLEPHRINE HYDROCHLORIDE 200 MCG/MIN: 10 INJECTION INTRAVENOUS at 18:30

## 2019-01-01 RX ADMIN — SODIUM BICARBONATE 100 MEQ: 84 INJECTION, SOLUTION INTRAVENOUS at 18:27

## 2019-01-01 RX ADMIN — PHYTONADIONE 5 MG: 10 INJECTION, EMULSION INTRAMUSCULAR; INTRAVENOUS; SUBCUTANEOUS at 21:39

## 2019-01-01 RX ADMIN — NITROGLYCERIN 0.4 MG: 0.4 TABLET, ORALLY DISINTEGRATING SUBLINGUAL at 05:36

## 2019-01-01 RX ADMIN — INSULIN LISPRO 10 UNITS: 100 INJECTION, SOLUTION INTRAVENOUS; SUBCUTANEOUS at 03:00

## 2019-01-01 RX ADMIN — PHENYLEPHRINE HYDROCHLORIDE 25 MCG/MIN: 10 INJECTION INTRAVENOUS at 09:48

## 2019-01-01 RX ADMIN — FAMOTIDINE 20 MG: 10 INJECTION, SOLUTION INTRAVENOUS at 05:36

## 2019-01-01 RX ADMIN — SODIUM BICARBONATE: 84 INJECTION, SOLUTION INTRAVENOUS at 00:56

## 2019-01-01 RX ADMIN — PHENYLEPHRINE HYDROCHLORIDE 200 MCG/MIN: 10 INJECTION INTRAVENOUS at 05:20

## 2019-01-01 RX ADMIN — HYDROCORTISONE SODIUM SUCCINATE 50 MG: 100 INJECTION, POWDER, FOR SOLUTION INTRAMUSCULAR; INTRAVENOUS at 19:34

## 2019-01-01 RX ADMIN — SODIUM BICARBONATE 50 MEQ: 84 INJECTION, SOLUTION INTRAVENOUS at 10:13

## 2019-01-01 RX ADMIN — ALBUMIN (HUMAN) 12.5 G: 0.25 INJECTION, SOLUTION INTRAVENOUS at 19:32

## 2019-01-01 RX ADMIN — PHENYLEPHRINE HYDROCHLORIDE 200 MCG/MIN: 10 INJECTION INTRAVENOUS at 18:19

## 2019-01-01 RX ADMIN — ONDANSETRON 4 MG: 2 INJECTION INTRAMUSCULAR; INTRAVENOUS at 05:52

## 2019-01-01 RX ADMIN — PHENYLEPHRINE HYDROCHLORIDE 200 MCG/MIN: 10 INJECTION INTRAVENOUS at 21:41

## 2019-01-01 RX ADMIN — MEROPENEM 500 MG: 500 INJECTION, POWDER, FOR SOLUTION INTRAVENOUS at 21:29

## 2019-01-01 RX ADMIN — HYDROCORTISONE SODIUM SUCCINATE 50 MG: 100 INJECTION, POWDER, FOR SOLUTION INTRAMUSCULAR; INTRAVENOUS at 18:38

## 2019-01-01 RX ADMIN — MEROPENEM 1 G: 1 INJECTION, POWDER, FOR SOLUTION INTRAVENOUS at 02:29

## 2019-01-01 RX ADMIN — CALCIUM GLUCONATE 2 G: 94 INJECTION, SOLUTION INTRAVENOUS at 21:40

## 2019-01-01 RX ADMIN — HYDROCORTISONE SODIUM SUCCINATE 50 MG: 100 INJECTION, POWDER, FOR SOLUTION INTRAMUSCULAR; INTRAVENOUS at 16:02

## 2019-01-01 RX ADMIN — SODIUM CHLORIDE 1000 ML: 900 INJECTION, SOLUTION INTRAVENOUS at 05:47

## 2019-01-01 RX ADMIN — EPINEPHRINE 1 MG: 0.1 INJECTION, SOLUTION ENDOTRACHEAL; INTRACARDIAC; INTRAVENOUS at 10:13

## 2019-01-01 RX ADMIN — PANTOPRAZOLE SODIUM 80 MG: 40 INJECTION, POWDER, FOR SOLUTION INTRAVENOUS at 07:08

## 2019-01-01 RX ADMIN — EPINEPHRINE 10 MCG/MIN: 1 INJECTION, SOLUTION INTRAMUSCULAR; INTRAVENOUS; SUBCUTANEOUS at 14:38

## 2019-01-01 RX ADMIN — ALBUMIN (HUMAN) 12.5 G: 0.25 INJECTION, SOLUTION INTRAVENOUS at 00:07

## 2019-01-01 RX ADMIN — SODIUM CHLORIDE 75 ML/HR: 900 INJECTION, SOLUTION INTRAVENOUS at 08:48

## 2019-01-01 RX ADMIN — DOPAMINE HYDROCHLORIDE 20 MCG/KG/MIN: 160 INJECTION, SOLUTION INTRAVENOUS at 19:31

## 2019-01-01 RX ADMIN — EPINEPHRINE 5 MCG/MIN: 1 INJECTION, SOLUTION INTRAMUSCULAR; INTRAVENOUS; SUBCUTANEOUS at 11:47

## 2019-01-01 RX ADMIN — SUCCINYLCHOLINE CHLORIDE 100 MG: 20 INJECTION, SOLUTION INTRAMUSCULAR; INTRAVENOUS at 07:14

## 2019-01-01 RX ADMIN — LEVOFLOXACIN 750 MG: 5 INJECTION, SOLUTION INTRAVENOUS at 06:04

## 2019-01-01 RX ADMIN — PIPERACILLIN AND TAZOBACTAM 4.5 G: 4; .5 INJECTION, POWDER, LYOPHILIZED, FOR SOLUTION INTRAVENOUS; PARENTERAL at 05:21

## 2019-01-01 RX ADMIN — NOREPINEPHRINE BITARTRATE 16 MCG/MIN: 1 INJECTION, SOLUTION, CONCENTRATE INTRAVENOUS at 19:39

## 2019-01-01 RX ADMIN — SODIUM BICARBONATE: 84 INJECTION, SOLUTION INTRAVENOUS at 09:48

## 2019-01-01 RX ADMIN — NOREPINEPHRINE BITARTRATE 16 MCG/MIN: 1 INJECTION, SOLUTION, CONCENTRATE INTRAVENOUS at 11:25

## 2019-01-01 RX ADMIN — LORAZEPAM 1 MG: 2 INJECTION INTRAMUSCULAR; INTRAVENOUS at 08:45

## 2019-01-01 RX ADMIN — CHLORHEXIDINE GLUCONATE 10 ML: 1.2 RINSE ORAL at 21:29

## 2019-01-01 RX ADMIN — HYDROCORTISONE SODIUM SUCCINATE 50 MG: 100 INJECTION, POWDER, FOR SOLUTION INTRAMUSCULAR; INTRAVENOUS at 23:19

## 2019-01-01 RX ADMIN — PHENYLEPHRINE HYDROCHLORIDE 200 MCG/MIN: 10 INJECTION INTRAVENOUS at 02:24

## 2019-01-01 RX ADMIN — PHENYLEPHRINE HYDROCHLORIDE 10 MCG/MIN: 10 INJECTION INTRAVENOUS at 09:41

## 2019-01-01 RX ADMIN — PHENYLEPHRINE HYDROCHLORIDE 200 MCG/MIN: 10 INJECTION INTRAVENOUS at 00:00

## 2019-01-01 RX ADMIN — SODIUM CHLORIDE 40 MG: 9 INJECTION, SOLUTION INTRAMUSCULAR; INTRAVENOUS; SUBCUTANEOUS at 00:04

## 2019-01-01 RX ADMIN — INSULIN LISPRO 15 UNITS: 100 INJECTION, SOLUTION INTRAVENOUS; SUBCUTANEOUS at 06:31

## 2019-01-01 RX ADMIN — CHLORHEXIDINE GLUCONATE 10 ML: 1.2 RINSE ORAL at 10:50

## 2019-01-01 RX ADMIN — DOPAMINE HYDROCHLORIDE 20 MCG/KG/MIN: 160 INJECTION, SOLUTION INTRAVENOUS at 18:29

## 2019-01-01 RX ADMIN — SODIUM CHLORIDE 1000 ML: 900 INJECTION, SOLUTION INTRAVENOUS at 05:51

## 2019-01-01 RX ADMIN — SODIUM CHLORIDE 500 ML: 900 INJECTION, SOLUTION INTRAVENOUS at 04:54

## 2019-01-01 RX ADMIN — HYDROCORTISONE SODIUM SUCCINATE 50 MG: 100 INJECTION, POWDER, FOR SOLUTION INTRAMUSCULAR; INTRAVENOUS at 05:06

## 2019-01-01 RX ADMIN — EPINEPHRINE 10 MCG/MIN: 1 INJECTION, SOLUTION INTRAMUSCULAR; INTRAVENOUS; SUBCUTANEOUS at 00:55

## 2019-01-01 RX ADMIN — SODIUM CHLORIDE 40 MG: 9 INJECTION, SOLUTION INTRAMUSCULAR; INTRAVENOUS; SUBCUTANEOUS at 21:29

## 2019-01-01 RX ADMIN — PHENYLEPHRINE HYDROCHLORIDE 200 MCG/MIN: 10 INJECTION INTRAVENOUS at 11:24

## 2019-01-01 RX ADMIN — CALCIUM GLUCONATE 4 G: 94 INJECTION, SOLUTION INTRAVENOUS at 06:19

## 2019-01-01 RX ADMIN — ACETAMINOPHEN 650 MG: 325 TABLET ORAL at 05:36

## 2019-01-01 RX ADMIN — NOREPINEPHRINE BITARTRATE 16 MCG/MIN: 1 INJECTION, SOLUTION, CONCENTRATE INTRAVENOUS at 02:25

## 2019-01-01 RX ADMIN — ALBUMIN (HUMAN) 12.5 G: 0.25 INJECTION, SOLUTION INTRAVENOUS at 05:05

## 2019-06-20 PROBLEM — E87.20 LACTIC ACIDOSIS: Status: ACTIVE | Noted: 2019-01-01

## 2019-06-20 PROBLEM — K92.0 GASTROINTESTINAL HEMORRHAGE WITH HEMATEMESIS: Status: ACTIVE | Noted: 2019-01-01

## 2019-06-20 PROBLEM — D50.0 ANEMIA DUE TO GI BLOOD LOSS: Status: ACTIVE | Noted: 2019-01-01

## 2019-06-20 PROBLEM — I42.9 CARDIOMYOPATHY (HCC): Status: ACTIVE | Noted: 2019-01-01

## 2019-06-20 PROBLEM — I21.4 NSTEMI (NON-ST ELEVATED MYOCARDIAL INFARCTION) (HCC): Status: ACTIVE | Noted: 2019-01-01

## 2019-06-20 PROBLEM — D68.9 COAGULOPATHY (HCC): Status: ACTIVE | Noted: 2019-01-01

## 2019-06-20 PROBLEM — I25.10 CAD (CORONARY ARTERY DISEASE): Status: ACTIVE | Noted: 2019-01-01

## 2019-06-20 PROBLEM — I21.3 STEMI (ST ELEVATION MYOCARDIAL INFARCTION) (HCC): Status: ACTIVE | Noted: 2019-01-01

## 2019-06-20 PROBLEM — I46.9 CARDIAC ARREST (HCC): Status: ACTIVE | Noted: 2019-01-01

## 2019-06-20 PROBLEM — I50.23 SYSTOLIC CHF, ACUTE ON CHRONIC (HCC): Status: ACTIVE | Noted: 2019-01-01

## 2019-06-20 PROBLEM — E87.20 METABOLIC ACIDOSIS: Status: ACTIVE | Noted: 2019-01-01

## 2019-06-20 PROBLEM — R57.0 SHOCK, CARDIOGENIC (HCC): Status: ACTIVE | Noted: 2019-01-01

## 2019-06-20 PROBLEM — J96.01 ACUTE RESPIRATORY FAILURE WITH HYPOXEMIA (HCC): Status: ACTIVE | Noted: 2019-01-01

## 2019-06-20 NOTE — PROGRESS NOTES
Pulm/CC Janet Crocker Son   [phone] I have called and d/w patient son; he is currently in [place]; discussed patient medical condition and critically ill state.  
 
Paige Calderon MD

## 2019-06-20 NOTE — PROCEDURES
[facility] Central Line Procedure Note with US guidance Indication:  
Shock state Cardiac arrest 
Acute respiratory failure Risks, benefits, alternatives explained and consent obtained from patient son over phone. Patient positioned in Trendelenburg. Full sterile barrier precautions used. Sterility Protocol followed. (cap, mask sterile gown, sterile gloves, large sterile sheet, hand hygiene, 2% chlorhexidine for cutaneous antisepsis, sterile probe cover). Using ultrasound guidance, 7 Fr triple lumen cath placed and secured at 16 cm. RIGHT femoral vein cannulated x 1 attempt(s) utilizing the Seldinger technique. Position of wire confirmed in vein using US before dilating. Wire was removed. No immediate complications. Good non-pulsatile venous blood return, carefully flushed. Catheter secured & Biopatch applied. Sterile Tegaderm placed. Ok to use line Maycol Keys MD

## 2019-06-20 NOTE — ED NOTES
Pt hourly rounding competed. Safety Pt (**) resting on stretcher with side rails up and call bell in reach. () in chair 
  () in parents arms. Toileting Pt offered ()Bedpan 
   ()Assistance to Restroom 
   (**)Urinal 
Ongoing UpdatesUpdated on plan of care and status of test results. Pain Management Inquired as to comfort and offered comfort measures: 
  () warm blankets (**) dimmed lights

## 2019-06-20 NOTE — PROGRESS NOTES
Admit Date: 6/20/2019 Date/ time: 6/20/2019, 11:14 AM  
Start Time 6/20/2019, 1010 End Time: 6/20/2019, 2078 Assessment:  
Type of Arrest: brayan then PEA Other medical problems:  
Active Problems: 
  NSTEMI (non-ST elevated myocardial infarction) ([address] (6/20/2019) Acute respiratory failure with hypoxemia ([address] (6/20/2019) Shock, cardiogenic ([address] (6/20/2019) Coagulopathy ([address] (6/20/2019) Gastrointestinal hemorrhage with hematemesis (6/20/2019) Cardiomyopathy ([address] (6/20/2019) CAD (coronary artery disease) (6/20/2019) Lactic acidosis (6/20/2019) Anemia due to GI blood loss (6/20/2019) Patient Active Problem List  
Diagnosis Code  NSTEMI (non-ST elevated myocardial infarction) ([address] I21.4  Acute respiratory failure with hypoxemia (HCC) J96.01  Shock, cardiogenic ([address] R57.0  Coagulopathy ([address] D68.9  
 Gastrointestinal hemorrhage with hematemesis K92.0  Cardiomyopathy ([address] I42.9  CAD (coronary artery disease) I25.10  Lactic acidosis E87.2  Anemia due to GI blood loss D50.0 Past Medical History:  
Diagnosis Date  Diabetes ([address]  Heart failure ([address]  Hypertension Patient already orally intubated and on vent. He is on norepinephrine and phenylephrine. Blood transfusion ordered Plan:  
 
Subjective: Code blue called on this patient for initial brayan and then Interventions: CPR From: 0308 - 5622 Epi 5 amps Bicarb 3 amp Patient already on norepinephrine and phenylephrine, dopamine ordered. Blood transfusion started. US done to check heart showed very minimal vent contraction, femoral pulse via doppler. ABG at 0908 with pH of 6.953.

## 2019-06-20 NOTE — PROGRESS NOTES
RESPIRATORY NOTE: 
Pt intubated per Dr. Victoria Carroll ED 7.5 ETT secured at 25 cm teeth line, placed on ventilator, awaiting x-ray, ABG to follow, will monitor.

## 2019-06-20 NOTE — ED NOTES
Bedside shift change report given to Ana Erickson RN (oncoming nurse) by this RN (offgoing nurse). Report included the following information SBAR.

## 2019-06-20 NOTE — PROGRESS NOTES
Pulm/CC Patient critically ill on 4 pressors currently - levophed, epinephrine, dopamine, phenylephrine - all at high or max doses On vent support set at high min vent ABG shows severe metabolic acidosis - prn bicarb pushes and bicarb infusions Fluids - bicarb infusion; albumin q6 Vit K 5 mg ivpg; 2 units FFPs Hb stable - no active GI bleeding; follow H/H q6; Platelets dropped 55Y Stress dose steroids since am - refractory shock Ab - expanded to include levquin, merrem and iv vanc Calcium replacements Lactic acidosis elevated; shock liver and acute renal failure Trops elev 11.10 last one 
EF 26-30% Appreciate cardiology Dr Rayna Cristobal and GI Dr Rosina Wong input Patient not sedated; not responsive; anoxic brain injury s/p cardiac arrest PEA for about 20 mins SCDs and GI proph Vent bundles Prognosis poor; patient unlikely to survive; no family at bedside Anish Santos Son     [phone] I have called and updated patient son; he is currently driving from [place]; discussed patient medical condition and critically ill state. CC time 36 mins, excludes d/w family or procedures Marko Fabry, MD

## 2019-06-20 NOTE — CONSULTS
Harvinder Benites Name:  Alyce Lundborg 
MR#:   [id number] :  1955 ACCOUNT #:  [de-identified] DATE OF SERVICE:  2019 HISTORY OF PRESENT ILLNESS:  This is a 39-year-old gentleman who is traveling from [place], who has history of severe coronary artery disease, had CABG in , had multiple myocardial infarctions that were never treated. He is diabetic. Has hypertension. Has congestive heart failure. He used to be a smoker. He quit and then regained back until  when he had a CVA. Since that time, he has been on Coumadin since . He also takes aspirin but no NSAIDs. He presented this morning at 4 a.m. to the emergency room with hematemesis that apparently started last night. Apparently according to his wife, he has been not feeling well for the last 2 weeks. We are not sure exactly about what were his symptoms. We found that he has melena and we are not sure for how long he had melena but shortly after arriving and getting the CT scan of the abdomen, he went into cardiogenic shock where his pulse went to 122, blood pressure dropped to 72/27 around 6:53 a.m. After this, he had a cardiac arrest where he had a CPR and he was put on the ventilator. He is presently on 4 different pressors and his blood pressure is still very low. He has been urinating. Initially, the orogastric tube showed the evidence of dark blood but later on, this became green. No evidence of any blood. He did not have any bowel movements since he has been admitted to the hospital.  His hemoglobin on arrival was 8 at 4:05, and at 8:36, dropped to 6.4. He received 2 blood transfusions and his hemoglobin increased to 9.8. His platelets were normal and his WBC was 21, 81% neutrophils. His INR was 3. He received vitamin K and Kcentra. His INR improved to 1.7.   The patient was started on Protonix, I believe he received 80 mg and then he was put on 40 mg every 12 hours. He was given large spectrum antibiotics including Levaquin, meropenem, and vancomycin just because he had criteria of sepsis. He is diabetic, so he was put on insulin injection. He is on Levophed, dopamine drip. He did have a lot of urine but he did not receive any diuretics. Presently, the patient is sedated. MEDICATIONS AT HOME: 
1. Aspirin 81 mg. 
2.  Vitamin D3. 3.  Jardiance 25 mg. 
4.  Amaryl 4 mg. 
5.  Glucosamine. 6.  Isosorbide mononitrate 50 mg. 
7.  Cozaar 25. 
8.  Metoprolol XL 50. 
9.  Sublingual nitroglycerin. 10.  Crestor 20. 11.  Coumadin 7.5. 12.  Glucophage 500 mg b.i.d. 13.  Aldactone 25 mg. ALLERGIES:  HE HAS NO KNOWN DRUG ALLERGIES. PHYSICAL EXAMINATION: 
GENERAL:  He is obese. He is intubated. His eyes are closed. The mouth is hard to evaluate but appeared to have pale and dry mucus membrane. VITAL SIGNS:  His temperature is 93.2, pulse 93 to 97, blood pressure 83/36, saturation 92%, breathing 20. He weighs 195 pounds. SKIN:  Remarkable for bright white skin, mostly on the lower extremities. LUNGS:  Clear to auscultation. HEART:  Cardiac rhythm is hard to appreciate but it is distant. ABDOMEN:  The abdomen is rounded but soft, not tender. No mass or organomegaly. Bowel sounds are normal. 
EXTREMITIES:  There is no leg edema. He does have a Dillon catheter draining a clear urine. NEUROLOGIC:  He is completely sedated and does not respond to noxious stimulations. LABORATORY DATA:  His hemoglobin is 9.8, platelets 883. Urinalysis showed more than a 1000 glucose. Complete metabolic panel, we have a sodium of 135, BUN 50, creatinine 1.21. Normal LFT. Troponin initially was 0.75 but after CPR, erika up to 11.1 and CPK-MB increased from 4 to 30.8. He did not receive any electric shock. CT scan of the abdomen showed that he has steatosis but nothing else.   A chest x-ray, no preliminary process identified. The endotracheal tube is 3 cm from the frederick and the enteric tube is in the stomach. The patient does have some central lines in his right neck and also right groin area. He did not get any cardiac catheterization. CONCLUSION:  This is a 65-year-old male who presented with upper GI bleeding in the form of hematemesis and melena. We are not sure for how long he has been bleeding but he was already on Coumadin with INR of 3. He seems to have bled massively as his hemoglobin dropped to 6.4. He presented with hypovolemic and cardiogenic shock complicated by myocardial infarction. Presently, he is not bleeding and he is still in cardiogenic shock. I think the risk of doing an endoscopy at this time is just great and I prefer to wait until he is more stable to scope him. There is no need to accelerate this because there is no active bleeding. The anticoagulation had been already reversed or improved. Unfortunately, he has significant  ischemic heart disease with a new myocardial infarction on top of multiple previous one. His prognosis is poor but we hope that he will improve on his own. Apparently, he is not a candidate for cardiac catheterization at least at this time. He is already seems to be slightly more stable at this time with all his medication. We will keep an eye on him. We will check the Helicobacter pylori. For now, we will continue to give him the IV Protonix. Should he start to bleed, then I may consider doing an urgent endoscopy. He is diabetic. Has mild renal injury. He is obese. Further note to follow. MD TYLER Gupta/[email or web address]/[email or web address] 
D:  06/20/2019 13:33 
T:  06/20/2019 14:22 
JOB #:  [id number]

## 2019-06-20 NOTE — PROGRESS NOTES
conducted a Follow up consultation and Spiritual Assessment for Sharon Davison, who is a 58 y.o.,male. Multiple calls with patient's son Shahana España ([phone]) who is in [place]. He is working to get here as soon as possible. Patient's wife lives in [facility]. She is not answering her phone and I was not able to leave a message. We offered to send an Wayne Slay to pick her up (she does not drive). Son explained that she has her own medical issues and doesn't understand much because of language issues. He wants to be here to bring her up. He was told by the physicians the gravity of the patient's condition and that there is a high chance that the patient may not live through the night. Son is driving now. Assume he will arrive by 3 am or so. He will  his mom and bring her here. He asked that we give reassuring messages to the patient that they are on their way. Chaplains will continue to follow and will provide pastoral care as needed or requested.  recommends bedside caregivers page the  on duty if patient shows signs of acute spiritual or emotional distress. [address] [facility], [facility] [address]. Board Certified Ashu Borja [phone] - Office

## 2019-06-20 NOTE — PROGRESS NOTES
Pulm/CC Stat paged to ER for cardiac arrest; PEA arrest 
Patient underwent CPR with epinephrine and bicarb pushes ROSC obtained roughly after 20 mins ET co2 monitored during CPR ranged 20-30s Patient moved to icu Called and updated son over phone No family at bedside Patient not stable for hypothermia protocol due to anemia, UGI bleeding, critically ill on pressors. Dr Maria Del Carmen Lynne at bedside; reviewed management; continue vent support; stabilize Hb; not a candidate for cath lab at this time.   
Awaiting GI eval.  
 
Cora Nails MD

## 2019-06-20 NOTE — CONSULTS
[facility] Pulmonary, Critical Care, and Sleep Medicine Initial Patient Consult Name: Greg Sweeney MRN: [id number] : 1955 Hospital: [facility] [place] Date: 2019  Room: ER03/03 Subjective: This patient has been seen and evaluated at the request of Dr. Melodie Conner for patient on vent support. Patient is a 58 y.o. male with hx of CAD, CM, EF 35%, mild chronic thrombocytopenia, on chronic coumadin therapy with cardiology follow up by Dr Arik Anders. Patient came to ER this am, with dizziness, SOB, CPs. He was c/o dark colored vomiting. He was sent to CT abd study, and patient came back to ER with worsening SOB, and was intubated. He is also hypotensive on pressor. He has recieved fluids. He is planned for PRBC and Kcentra. EKG shows ischemic changes. Dr Kristopher Espinal from Cardiology and Dr Ace Nelson from GI have already been consulted. No hx of chronic liver disease. Patient seen and examined in ER. No family at bedside. He is on vent support. He is on norepinephrine drip. He is making urine. Past Medical History:  
Diagnosis Date  Diabetes ([address]  Heart failure ([facility] [address]  Hypertension History reviewed. No pertinent surgical history. Prior to Admission medications Not on File No Known Allergies Social History Tobacco Use  Smoking status: Former Smoker  Smokeless tobacco: Never Used Substance Use Topics  Alcohol use: Not Currently History reviewed. No pertinent family history. Current Facility-Administered Medications Medication Dose Route Frequency  piperacillin-tazobactam (ZOSYN) 4.5 g in 0.9% sodium chloride (MBP/ADV) 100 mL MBP  4.5 g IntraVENous Q6H  
 levoFLOXacin (LEVAQUIN) 750 mg in D5W IVPB  750 mg IntraVENous Q24H  Vancomycin- Pharmacy to dose  1 Each Other Rx Dosing/Monitoring  vancomycin (VANCOCIN) 1,000 mg in 0.9% sodium chloride 250 mL IVPB  1,000 mg IntraVENous Q12H  insulin regular (NOVOLIN R, HUMULIN R) injection 10 Units  10 Units IntraVENous NOW  morphine injection 2 mg  2 mg IntraVENous NOW  morphine injection 2 mg  2 mg IntraVENous ONCE  
 NOREPINephrine (LEVOPHED) 8 mg in dextrose 5% 250 mL infusion  4 mcg/min IntraVENous CONTINUOUS  
 LORazepam (ATIVAN) injection 1 mg  1 mg IntraVENous ONCE  
 NOREPINephrine (LEVOPHED) 8 mg in dextrose 5% 250 mL infusion  2-16 mcg/min IntraVENous TITRATE  human prothrombin complex [facility] 4-factor (KCENTRA) injection 4,425 Int'l Units  50 Int'l Units/kg IntraVENous ONCE  
 0.9% sodium chloride infusion  75 mL/hr IntraVENous CONTINUOUS  
 midazolam (VERSED) 100 mg in 0.9% sodium chloride 100 mL infusion  1-5 mg/hr IntraVENous TITRATE Review of Systems: 
Limited due to patient condition Objective:  
Vital Signs:   
Visit Vitals BP (!) 82/37 Pulse (!) 108 Temp 97.9 °F (36.6 °C) Resp 16 Ht 5' 6\" (1.676 m) Wt 88.5 kg (195 lb) SpO2 99% BMI 31.47 kg/m² O2 Device: Room air Temp (24hrs), Av.9 °F (36.6 °C), Min:97.9 °F (36.6 °C), Max:97.9 °F (36.6 °C) Intake/Output:  
Last shift:      No intake/output data recorded. Last 3 shifts:  1901 -  0700 In: 150 [I.V.:150] Out: - Intake/Output Summary (Last 24 hours) at 2019 2462 Last data filed at 2019 3092 Gross per 24 hour Intake 150 ml Output  Net 150 ml ABG:  No results found for: PH, PHI, PCO2, PCO2I, PO2, PO2I, HCO3, HCO3I, FIO2, FIO2I Ventilator Mode: Assist control Respiratory Rate Back-Up Rate: 14 Insp Time (sec): 1.5 sec Ventilator Volumes Vt Set (ml): 450 ml Vt Exhaled (Machine Breath) (ml): 454 ml Ve Observed (l/min): 6.6 l/min Ventilator Pressures PIP Observed (cm H2O): 16 cm H2O 
MAP (cm H2O): 7.6 PEEP/VENT (cm H2O): 5 cm H20 Physical Exam:  
Comfortable; on vent support; Fio2 at 40%; acyanotic HEENT: pupils not dilated, reactive, no scleral jaundice, moist oral mucosa, no nasal drainage Neck: No adenopathy or thyroid swelling CVS: S1S2 no murmurs; JVD not elevated RS: Mod air entry bilaterally, decreased BS at bases, no wheezes or crackles Abd: soft, non tender, no hepatosplenomegaly, no abd distension, no guarding or rigidity, bowel sounds heard Neuro: intubated, sedated, limited exam 
Extrm: no leg edema or swelling or clubbing Skin: no rash Lymphatic: no cervical or supraclavicular adenopathy RT SV central line Telemetry: sinus tach Data review:  
 
Recent Results (from the past 24 hour(s)) EKG, 12 LEAD, INITIAL Collection Time: 06/20/19  4:03 AM  
Result Value Ref Range Ventricular Rate 97 BPM  
 Atrial Rate 97 BPM  
 P-R Interval 186 ms QRS Duration 72 ms Q-T Interval 360 ms QTC Calculation (Bezet) 457 ms Calculated P Axis 70 degrees Calculated R Axis 71 degrees Calculated T Axis -109 degrees Diagnosis Normal sinus rhythm Marked ST abnormality, possible inferior subendocardial injury Abnormal ECG No previous ECGs available CBC WITH AUTOMATED DIFF Collection Time: 06/20/19  4:05 AM  
Result Value Ref Range WBC 21.0 (H) 4.6 - 13.2 K/uL  
 RBC 2.99 (L) 4.70 - 5.50 M/uL HGB 8.0 (L) 13.0 - 16.0 g/dL HCT 25.3 (L) 36.0 - 48.0 % MCV 84.6 74.0 - 97.0 FL  
 MCH 26.8 24.0 - 34.0 PG  
 MCHC 31.6 31.0 - 37.0 g/dL  
 RDW 16.6 (H) 11.6 - 14.5 % PLATELET 422 967 - 355 K/uL MPV 12.4 (H) 9.2 - 11.8 FL  
 NEUTROPHILS 81 (H) 40 - 73 % LYMPHOCYTES 14 (L) 21 - 52 % MONOCYTES 5 3 - 10 % EOSINOPHILS 0 0 - 5 % BASOPHILS 0 0 - 2 %  
 ABS. NEUTROPHILS 16.8 (H) 1.8 - 8.0 K/UL  
 ABS. LYMPHOCYTES 3.0 0.9 - 3.6 K/UL  
 ABS. MONOCYTES 1.1 0.05 - 1.2 K/UL  
 ABS. EOSINOPHILS 0.0 0.0 - 0.4 K/UL  
 ABS. BASOPHILS 0.0 0.0 - 0.1 K/UL  
 DF AUTOMATED METABOLIC PANEL, COMPREHENSIVE Collection Time: 06/20/19  4:05 AM  
Result Value Ref Range Sodium 135 (L) 136 - 145 mmol/L  Potassium 4.7 3.5 - 5.5 mmol/L  
 Chloride 101 100 - 108 mmol/L  
 CO2 16 (L) 21 - 32 mmol/L Anion gap 18 3.0 - 18 mmol/L Glucose 234 (H) 74 - 99 mg/dL BUN 50 (H) 7.0 - 18 MG/DL Creatinine 1.21 0.6 - 1.3 MG/DL  
 BUN/Creatinine ratio 41 (H) 12 - 20 GFR est AA >60 >60 ml/min/1.73m2 GFR est non-AA >60 >60 ml/min/1.73m2 Calcium 8.4 (L) 8.5 - 10.1 MG/DL Bilirubin, total 0.3 0.2 - 1.0 MG/DL  
 ALT (SGPT) 27 16 - 61 U/L  
 AST (SGOT) 22 15 - 37 U/L Alk. phosphatase 44 (L) 45 - 117 U/L Protein, total 6.2 (L) 6.4 - 8.2 g/dL Albumin 3.4 3.4 - 5.0 g/dL Globulin 2.8 2.0 - 4.0 g/dL A-G Ratio 1.2 0.8 - 1.7 CARDIAC PANEL,(CK, CKMB & TROPONIN) Collection Time: 06/20/19  4:05 AM  
Result Value Ref Range CK 80 39 - 308 U/L  
 CK - MB 4.0 (H) <3.6 ng/ml CK-MB Index 5.0 (H) 0.0 - 4.0 % Troponin-I, QT 0.21 (H) 0.0 - 0.045 NG/ML  
NT-PRO BNP Collection Time: 06/20/19  4:05 AM  
Result Value Ref Range NT pro- 0 - 900 PG/ML  
MAGNESIUM Collection Time: 06/20/19  4:05 AM  
Result Value Ref Range Magnesium 2.4 1.6 - 2.6 mg/dL PROTHROMBIN TIME + INR Collection Time: 06/20/19  4:40 AM  
Result Value Ref Range Prothrombin time 31.8 (H) 11.5 - 15.2 sec INR 3.0 (H) 0.8 - 1.2 PTT Collection Time: 06/20/19  4:40 AM  
Result Value Ref Range aPTT 53.1 (H) 23.0 - 36.4 SEC POC LACTIC ACID Collection Time: 06/20/19  4:56 AM  
Result Value Ref Range Lactic Acid (POC) 4.10 (HH) 0.40 - 2.00 mmol/L  
CULTURE, BLOOD Collection Time: 06/20/19  4:57 AM  
Result Value Ref Range Special Requests: NO SPECIAL REQUESTS Culture result: NO GROWTH AFTER 1 HOUR    
OCCULT BLOOD, STOOL Collection Time: 06/20/19  5:20 AM  
Result Value Ref Range Occult blood, stool POSITIVE (A) NEG    
TYPE & SCREEN Collection Time: 06/20/19  5:25 AM  
Result Value Ref Range Crossmatch Expiration 06/23/2019 ABO/Rh(D) O POSITIVE Antibody screen NEG   
EKG, 12 LEAD, INITIAL Collection Time: 06/20/19  6:59 AM  
Result Value Ref Range Ventricular Rate 124 BPM  
 Atrial Rate 119 BPM  
 QRS Duration 82 ms Q-T Interval 322 ms QTC Calculation (Bezet) 462 ms Calculated R Axis 63 degrees Calculated T Axis -99 degrees Diagnosis Accelerated Junctional rhythm Low voltage QRS Cannot rule out Anteroseptal infarct , age undetermined Marked ST abnormality, possible inferior subendocardial injury Abnormal ECG When compared with ECG of 20-JUN-2019 04:03, 
Junctional rhythm has replaced Sinus rhythm Minimal criteria for Anteroseptal infarct are now present ST more depressed in Inferior leads URINALYSIS W/ RFLX MICROSCOPIC Collection Time: 06/20/19  7:00 AM  
Result Value Ref Range Color YELLOW Appearance CLEAR Specific gravity 1.030 1.005 - 1.030    
 pH (UA) 5.0 5.0 - 8.0 Protein NEGATIVE  NEG mg/dL Glucose >1,000 (A) NEG mg/dL Ketone 15 (A) NEG mg/dL Bilirubin NEGATIVE  NEG Blood NEGATIVE  NEG Urobilinogen 0.2 0.2 - 1.0 EU/dL Nitrites NEGATIVE  NEG Leukocyte Esterase NEGATIVE  NEG No results for input(s): FIO2I, IFO2, HCO3I, IHCO3, HCOPOC, PCO2I, PCOPOC, IPHI, PHI, PHPOC, PO2I, PO2POC in the last 72 hours. No lab exists for component: IPOC2 All Micro Results Procedure Component Value Units Date/Time CULTURE, BLOOD [[id number]] Collected:  06/20/19 0457 Order Status:  Completed Specimen:  Whole Blood Updated:  06/20/19 6544 Special Requests: NO SPECIAL REQUESTS Culture result: NO GROWTH AFTER 1 HOUR     
 CULTURE, BLOOD [[id number]] Collected:  06/20/19 0525 Order Status:  Completed Specimen:  Blood Updated:  06/20/19 0535 ECHO Echo Cardiogram Complete9/24/2018 [address] Component Name Value Ref Range EF Echo 35    
Result Impression  
: 
 MILD LEFT VENTRICULAR ENLARGEMENT WITH NORMAL WALL THICKNESS. REDUCED LEFT VENTRICULAR SYSTOLIC FUNCTION, VISUAL EJECTION FRACTION 35%. SEVERE APICAL, MID TO APICAL ANTERIOR AND ANTEROSEPTAL  HYPOKINESIS. MILD APICAL INFERIOR HYPOKINESIS. STAGE I DIASTOLIC DYSFUNCTION. NORMAL RIGHT VENTRICULAR SIZE AND FUNCTION, VISUALLY. MILD MITRAL REGURGITATION. TRACE TRICUSPID REGURGITATION. MILD AORTIC VALVE SCLEROSIS WITHOUT STENOSIS. PEAK TRICUSPID VELOCITY IS 2.1 M/S SUGGESTING  NORMAL PULMONARY ARTERY PRESSURE. NO PREVIOUS REPORT FOR COMPARISON. Imaging: 
[x]I have personally reviewed the patients chest radiographs images and report Results from Hospital Encounter encounter on 06/20/19 XR ABD (KUB) Narrative EXAM: XR ABD (KUB) INDICATION: OG insertion COMPARISON: None. FINDINGS: A supine radiograph of the abdomen shows a normal bowel gas pattern. Enteric tube tip in stomach. No soft tissue masses or pathologic calcifications 
are identified. The bones and soft tissues are within normal limits. Impression IMPRESSION: Enteric tube tip in stomach. Unremarkable otherwise. Results from Hospital Encounter encounter on 06/20/19 CT ABD PELV W CONT Narrative EXAM: CT ABDOMEN AND PELVIS IV ONLY 
 
INDICATION: Abdominal pain , nausea and vomiting, dizziness COMPARISON: No prior comparison study TECHNIQUE: Axial CT imaging of the abdomen and pelvis was performed after the 
administration of only IV contrast as per specific clinician request.  
Multiplanar reformats were generated. One or more dose reduction techniques 
were used on this CT: automated exposure control, adjustment of the mAs and/or kVp according to patient's size, and iterative reconstruction techniques. The 
specific techniques utilized on this CT exam have been documented in the 
patient's electronic medical record.  Digital Imaging and Communications in 
Medicine (DICOM) format image data are available to nonaffiliated external 
 healthcare facilities or entities on a secure, media free, reciprocally 
searchable basis with patient authorization for at least a 12-month period after 
this study. _______________ FINDINGS: 
 
Motion artifact is present which limits evaluation. LOWER CHEST: Mild dependent atelectasis is present in the lung bases. Partially 
visualized median sternotomy wires are present. There is no pericardial or 
pleural effusion. LIVER, BILIARY: The liver has diffusely decreased attenuation consistent with 
hepatic steatosis. No abnormal biliary dilation. Gallbladder is unremarkable. PANCREAS: Fatty atrophic changes are seen in the pancreas. SPLEEN: Unremarkable. ADRENALS: Unremarkable. KIDNEYS: Unremarkable. LYMPH NODES: No enlarged lymph nodes by size criteria. GASTROINTESTINAL TRACT: The lack of oral contrast limits bowel evaluation. No 
abnormal bowel dilation or wall thickening. Appendix is within normal limits. PELVIC ORGANS: Unremarkable. No free fluid or fluid collection is seen. VASCULATURE: Atherosclerotic calcifications are present. OSSEOUS: Degenerative changes are seen in the spine, particularly involving the 
lower lumbar facet joints. OTHER: None. 
 
_______________ Impression IMPRESSION: 
 
1. No acute solid organ or bowel abnormality. 2. Hepatic steatosis. IMPRESSION:  
 
Patient Active Problem List  
Diagnosis Code  NSTEMI (non-ST elevated myocardial infarction) ([facility] [address] I21.4  Acute respiratory failure with hypoxemia (HCC) J96.01  Shock, cardiogenic ([address] R57.0  Coagulopathy ([address] D68.9  
 Gastrointestinal hemorrhage with hematemesis K92.0  Cardiomyopathy ([address] I42.9  CAD (coronary artery disease) I25.10  Lactic acidosis E87.2  Anemia due to GI blood loss D50.0  
  
  
RECOMMENDATIONS:  
· Pulm: Vent management; VCV 15/450/40%/peep 5; Vent bundle, weaning per protocol; keep O2 sats 92% or above; keep plateau pressure <23; CXR reviewed - RT SV line, ET tube good; low lung volumes; check ABG pending · ABG shows severe metabolic acidosis; 2 amp bicarb stat; bicarb drip ordered; follow ABG · Sedation: versed drip and prn fentanyl for RASS neg 1 to neg 2 (light sedation) · Cardiac: on norepinephrine drip; Cardiology Dr Constance Rendon consulted - high risk candidate for cath lab due to GI bleeding state; patient would not be able to tolerate antiplatelets due to active GI bleeding; stat Echo; follow cardiac panel · Hem: keep Hb > 9 gm/dl due to acute MI; plan H/H q6; Kcentra and 1 unit Prbc planned; add another unit Prbc. Follow coags. · ID: BS antibiotics; follow lactic acid which is likely driven by cardiogenic shock · Renal: watch IOs and renal fn 
· GI: PPI bid - no drip due to shortage; GI Dr Danielle Barber consulted; patient appears too unstable for endoscopy, but would defer to GI team; no hx of liver disease · Neuro: was awake, alert on arrival to ER · Endo: watch BG; SSI · Vasc: RT SV line · Fluids: Bicarb drip 75/hr; watch for CHG - patient in cardiogenic shock and likely to develop pulm edema · Nutrition:  OGT, NPO for now · Proph:  DVt and GI proph - SCDs and PPI · D/w Dr Shannon Patel in [facility]; D/w RT and RN Will defer respective systems problem management to primary and other consultant and follow patient in ICU with primary and other medical team 
Further recommendations will be based on the patient's response to recommended treatment and results of the investigation ordered. Quality Care: PPI, DVT prophylaxis, HOB elevated, Infection control all reviewed and addressed. · Lines/Tubes: PIVs; ET tube, OG tube, RT SV line, Dillon cath ADVANCE DIRECTIVE: Full Code · Thank you for the consult · Prognosis is very poor; mortality is high.    
 
High complexity decision making was performed in this consultation and evaluation of this patient who is at high risk for decompensation with multiple organ involvement Total critical care time spent rendering care exclusive of procedures: 50 minutes Ny Adame MD

## 2019-06-20 NOTE — PROCEDURES
[facility] Femoral Artery Line Procedure Note with US guidance Indication:  
Shock on pressors Acute respiratory failure Cardiac arrest 
 
Risks, benefits, alternatives explained and consent obtained from patient son over phone. Patient positioned in supine position. Full sterile barrier precautions used. Sterility Protocol followed. (cap, mask sterile gown, sterile gloves, large sterile sheet, hand hygiene, 2% chlorhexidine for cutaneous antisepsis, sterile probe cover). Risks of bleeding, infection, limb ischemia discussed; reviewed benefits of arterial line BP monitoring in state of profound shock. Using ultrasound guidance, 18 ga cath placed and secured at 16 cm. Right femoral artery cannulated utilizing the Seldinger technique. Number of attempts x 2. Position of wire confirmed in artery using US before dilating. Wire was removed. No immediate complications. Good pulsatile blood return, carefully flushed. Catheter secured & Biopatch applied. Sterile Tegaderm placed. Ok to use line - good arterial waveform Mariano Cohn MD

## 2019-06-20 NOTE — PROGRESS NOTES
Spoke with Dr. Francisca Turner on phone, discussed patient's hospitalization, agreed with the management.

## 2019-06-20 NOTE — ED PROVIDER NOTES
EMERGENCY DEPARTMENT HISTORY AND PHYSICAL EXAM 
 
Date: 6/20/2019 Patient Name: Perla Sinha History of Presenting Illness Chief Complaint Patient presents with  Nausea  Vomiting  Diarrhea History Provided By: Patient Additional History (Context):  
4:50 AM 
Perla Sinha is a 58 y.o. male with PMHx of DM, HTN, and heart failure who presents to the emergency department with complaint of nausea and vomiting, onset yesterday. Associated symptoms include dizziness, SOB, chest pain, and jaw pain. He states that his symptoms started with dizziness, described as room spinning, 4 days ago. He describes as emesis as \"dark. \" Patient is on Coumadin and takes ASA. He does not endorse tobacco (former, quit 1624), EtOH, or illicit drug use. Patient denies diarrhea, abdominal pain, fever, or any other sxs or complaints. PCP: Adolfo Camacho MD  
Cardiologist: Nu De Dios MD 
 
Current Facility-Administered Medications Medication Dose Route Frequency Provider Last Rate Last Dose  sodium chloride (NS) flush 5-10 mL  5-10 mL IntraVENous PRN Nuha Bernal MD      
 vancomycin (VANCOCIN) 1500 mg in  ml infusion  1,500 mg IntraVENous ONCE Nuha Bernal  mL/hr at 06/20/19 0536 1,500 mg at 06/20/19 6297  piperacillin-tazobactam (ZOSYN) 4.5 g in 0.9% sodium chloride (MBP/ADV) 100 mL MBP  4.5 g IntraVENous Q6H Nuha Bernal MD   Stopped at 06/20/19 6617  levoFLOXacin (LEVAQUIN) 750 mg in D5W IVPB  750 mg IntraVENous Q24H Nuha Bernal  mL/hr at 06/20/19 0604 750 mg at 06/20/19 4337  nitroglycerin (NITROSTAT) tablet 0.4 mg  0.4 mg SubLINGual PRN Nuha Bernal MD   0.4 mg at 06/20/19 0087  Vancomycin- Pharmacy to dose  1 Each Other Rx Dosing/Monitoring Nuha Bernal MD      
 vancomycin [facility]) 1,000 mg in 0.9% sodium chloride 250 mL IVPB  1,000 mg IntraVENous Q12H Nuha Bernal MD      
  insulin regular (NOVOLIN R, HUMULIN R) injection 10 Units  10 Units IntraVENous NOW Alissa Mandujano MD      
 iopamidol (ISOVUE 300) 61 % contrast injection 100 mL  100 mL IntraVENous RAD ONCE Alissa Mandujano MD      
 
 
Past History Past Medical History: 
Past Medical History:  
Diagnosis Date  Diabetes ([facility] [address]  Heart failure ([facility] [address]  Hypertension Past Surgical History: 
History reviewed. No pertinent surgical history. Family History: 
History reviewed. No pertinent family history. Social History: 
Social History Tobacco Use  Smoking status: Former Smoker  Smokeless tobacco: Never Used Substance Use Topics  Alcohol use: Not Currently  Drug use: Never Allergies: 
No Known Allergies Review of Systems Review of Systems Constitutional: Negative for chills, diaphoresis, fever and unexpected weight change. HENT: Negative for congestion, drooling, ear pain, rhinorrhea, sore throat, tinnitus and trouble swallowing.   
     (+) jaw pain Eyes: Negative for photophobia, pain, redness and visual disturbance. Respiratory: Positive for shortness of breath. Negative for cough, choking, chest tightness, wheezing and stridor. Cardiovascular: Positive for chest pain. Negative for palpitations and leg swelling. Gastrointestinal: Positive for nausea and vomiting. Negative for abdominal distention, abdominal pain, anal bleeding, blood in stool, constipation and diarrhea. Genitourinary: Negative for difficulty urinating, dysuria, flank pain, frequency, hematuria and urgency. Musculoskeletal: Negative for arthralgias, back pain and neck pain. Skin: Negative for color change, rash and wound. Neurological: Negative for dizziness, seizures, syncope, speech difficulty, light-headedness and headaches. Hematological: Does not bruise/bleed easily.   
Psychiatric/Behavioral: Negative for agitation, behavioral problems, hallucinations, self-injury and suicidal ideas. The patient is not hyperactive. Physical Exam  
 
Vitals:  
 06/20/19 0515 06/20/19 0530 06/20/19 0545 06/20/19 0600 BP: 118/60 107/71 99/56 116/66 Pulse: (!) 104 98 (!) 104 98 Resp: 19 21 23 20 Temp:      
SpO2: 100% 100% 100% 100% Weight:      
Height:      
 
Physical Exam  
Constitutional: He is oriented to person, place, and time. He appears well-developed and well-nourished. No distress. HENT:  
Head: Normocephalic and atraumatic. Right Ear: External ear normal.  
Left Ear: External ear normal.  
Mouth/Throat: Oropharynx is clear and moist. No oropharyngeal exudate. Eyes: Pupils are equal, round, and reactive to light. Conjunctivae and EOM are normal. No scleral icterus. No pallor Neck: Normal range of motion. Neck supple. No JVD present. No tracheal deviation present. No thyromegaly present. Cardiovascular: Normal rate, regular rhythm and normal heart sounds. Pulmonary/Chest: Effort normal and breath sounds normal. No stridor. No respiratory distress. Abdominal: Soft. Bowel sounds are normal. He exhibits no distension. There is no tenderness. There is no rebound and no guarding. Musculoskeletal: Normal range of motion. He exhibits no edema or tenderness. No soft tissue injuries Lymphadenopathy:  
  He has no cervical adenopathy. Neurological: He is alert and oriented to person, place, and time. He has normal reflexes. No cranial nerve deficit. Coordination normal.  
Skin: Skin is warm and dry. No rash noted. He is not diaphoretic. No erythema. Psychiatric: He has a normal mood and affect. His behavior is normal. Judgment and thought content normal.  
Nursing note and vitals reviewed. Diagnostic Study Results Labs - Recent Results (from the past 12 hour(s)) EKG, 12 LEAD, INITIAL Collection Time: 06/20/19  4:03 AM  
Result Value Ref Range  Ventricular Rate 97 BPM  
 Atrial Rate 97 BPM  
 P-R Interval 186 ms QRS Duration 72 ms Q-T Interval 360 ms QTC Calculation (Bezet) 457 ms Calculated P Axis 70 degrees Calculated R Axis 71 degrees Calculated T Axis -109 degrees Diagnosis Normal sinus rhythm Marked ST abnormality, possible inferior subendocardial injury Abnormal ECG No previous ECGs available CBC WITH AUTOMATED DIFF Collection Time: 06/20/19  4:05 AM  
Result Value Ref Range WBC 21.0 (H) 4.6 - 13.2 K/uL  
 RBC 2.99 (L) 4.70 - 5.50 M/uL HGB 8.0 (L) 13.0 - 16.0 g/dL HCT 25.3 (L) 36.0 - 48.0 % MCV 84.6 74.0 - 97.0 FL  
 MCH 26.8 24.0 - 34.0 PG  
 MCHC 31.6 31.0 - 37.0 g/dL  
 RDW 16.6 (H) 11.6 - 14.5 % PLATELET 344 398 - 296 K/uL MPV 12.4 (H) 9.2 - 11.8 FL  
 NEUTROPHILS 81 (H) 40 - 73 % LYMPHOCYTES 14 (L) 21 - 52 % MONOCYTES 5 3 - 10 % EOSINOPHILS 0 0 - 5 % BASOPHILS 0 0 - 2 %  
 ABS. NEUTROPHILS 16.8 (H) 1.8 - 8.0 K/UL  
 ABS. LYMPHOCYTES 3.0 0.9 - 3.6 K/UL  
 ABS. MONOCYTES 1.1 0.05 - 1.2 K/UL  
 ABS. EOSINOPHILS 0.0 0.0 - 0.4 K/UL  
 ABS. BASOPHILS 0.0 0.0 - 0.1 K/UL  
 DF AUTOMATED METABOLIC PANEL, COMPREHENSIVE Collection Time: 06/20/19  4:05 AM  
Result Value Ref Range Sodium 135 (L) 136 - 145 mmol/L Potassium 4.7 3.5 - 5.5 mmol/L Chloride 101 100 - 108 mmol/L  
 CO2 16 (L) 21 - 32 mmol/L Anion gap 18 3.0 - 18 mmol/L Glucose 234 (H) 74 - 99 mg/dL BUN 50 (H) 7.0 - 18 MG/DL Creatinine 1.21 0.6 - 1.3 MG/DL  
 BUN/Creatinine ratio 41 (H) 12 - 20 GFR est AA >60 >60 ml/min/1.73m2 GFR est non-AA >60 >60 ml/min/1.73m2 Calcium 8.4 (L) 8.5 - 10.1 MG/DL Bilirubin, total 0.3 0.2 - 1.0 MG/DL  
 ALT (SGPT) 27 16 - 61 U/L  
 AST (SGOT) 22 15 - 37 U/L Alk. phosphatase 44 (L) 45 - 117 U/L Protein, total 6.2 (L) 6.4 - 8.2 g/dL Albumin 3.4 3.4 - 5.0 g/dL Globulin 2.8 2.0 - 4.0 g/dL A-G Ratio 1.2 0.8 - 1.7 CARDIAC PANEL,(CK, CKMB & TROPONIN)  Collection Time: 06/20/19  4:05 AM  
 Result Value Ref Range CK 80 39 - 308 U/L  
 CK - MB 4.0 (H) <3.6 ng/ml CK-MB Index 5.0 (H) 0.0 - 4.0 % Troponin-I, QT 0.21 (H) 0.0 - 0.045 NG/ML  
NT-PRO BNP Collection Time: 06/20/19  4:05 AM  
Result Value Ref Range NT pro- 0 - 900 PG/ML  
MAGNESIUM Collection Time: 06/20/19  4:05 AM  
Result Value Ref Range Magnesium 2.4 1.6 - 2.6 mg/dL PROTHROMBIN TIME + INR Collection Time: 06/20/19  4:40 AM  
Result Value Ref Range Prothrombin time 31.8 (H) 11.5 - 15.2 sec INR 3.0 (H) 0.8 - 1.2 PTT Collection Time: 06/20/19  4:40 AM  
Result Value Ref Range aPTT 53.1 (H) 23.0 - 36.4 SEC POC LACTIC ACID Collection Time: 06/20/19  4:56 AM  
Result Value Ref Range Lactic Acid (POC) 4.10 (HH) 0.40 - 2.00 mmol/L  
CULTURE, BLOOD Collection Time: 06/20/19  4:57 AM  
Result Value Ref Range Special Requests: NO SPECIAL REQUESTS Culture result: NO GROWTH AFTER 1 HOUR    
OCCULT BLOOD, STOOL Collection Time: 06/20/19  5:20 AM  
Result Value Ref Range Occult blood, stool POSITIVE (A) NEG    
TYPE & SCREEN Collection Time: 06/20/19  5:25 AM  
Result Value Ref Range Crossmatch Expiration 06/23/2019 ABO/Rh(D) PENDING Antibody screen PENDING Radiologic Studies -   
XR CHEST PORT    (Results Pending) CT ABD PELV W CONT    (Results Pending) Medical Decision Making I am the first provider for this patient. I reviewed the vital signs, available nursing notes, past medical history, past surgical history, family history and social history. Vital Signs-Reviewed the patient's vital signs. Pulse Oximetry Analysis - 100% on room air Cardiac Monitor: 
Rate: 101 bpm 
Rhythm: Sinus tachycardia EKG interpretation: (Preliminary) 4:03 AM 
NSR at 97 bpm. Deep ST and T wave segment depression inferior leads II, III, AVF. EKG read by [employer]. Victoria Carroll MD 
 
Records Reviewed: Nursing Notes and Old Medical Records Provider Notes (Medical Decision Making):  
Ddx: Strongest concern is intraabdominal infection vs acute GI bleeding, paay or GI perforation, UTI Procedures: INTUBATE PATIENT Date/Time: 6/20/2019 6:48 AM 
Performed by: Tuyet Don MD 
Authorized by: Tuyet Don MD  
 
Consent:  
  Consent obtained:  Emergent situation Pre-procedure details:  
  Patient status:  Altered mental status Mallampati score:  II 
  Pretreatment meds: etomidate. Paralytics:  Succinylcholine Procedure details:  
  Preoxygenation:  Bag valve mask CPR in progress: no Intubation method:  Oral 
  Oral intubation technique:  Video-assisted Laryngoscope blade: Mac 3 Tube size (mm):  7.5 Tube type:  Cuffed Number of attempts:  2 Ventilation between attempts: no   
  Cricoid pressure: no   
  Tube visualized through cords: yes Placement assessment: ETT to teeth:  24 Tube secured with:  ETT akers Breath sounds:  Equal and absent over the epigastrium Placement verification: chest rise, condensation, CXR verification, direct visualization, equal breath sounds, ETCO2 detector, fiberoptic scope and tube exhalation Post-procedure details:  
  Patient tolerance of procedure: Tolerated well, no immediate complications CENTRAL VENOUS LINE INSERTION Date/Time: 6/24/2019 6:57 AM 
Performed by: Tuyet Don MD 
Authorized by: Tuyet Don MD  
 
Consent:  
  Consent obtained:  Emergent situation Alternatives discussed:  No treatment and delayed treatment Pre-procedure details:  
  Skin preparation:  2% chlorhexidine and Betadine Anesthesia (see MAR for exact dosages): Anesthesia method:  Local infiltration Local anesthetic:  Lidocaine 1% w/o epi Procedure details: Location:  R subclavian Patient position:  Flat Procedural supplies:  Triple lumen Catheter size:  7.5 Fr Landmarks identified: yes   Ultrasound guidance: no   
 Number of attempts:  1 Successful placement: yes Post-procedure details: Post-procedure:  Dressing applied Assessment:  Free fluid flow, blood return through all ports, no pneumothorax on x-ray and placement verified by x-ray Patient tolerance of procedure: Tolerated well, no immediate complications PROCEDURE NOTE - RECTAL EXAM:  
5:08 AM 
Performed by: Beatriz Aparicio. Brenda Burrows MD 
Rectal exam performed. Black stool was collected. Hemoccult test was sent to the lab. The procedure took 1-15 minutes, and pt tolerated well. Written by Yelena Beck ED Scribkristy, as dictated by Beatriz Aparicio. Brenda Burrows MD. 
 
 
MEDICATIONS GIVEN: 
Medications  
sodium chloride (NS) flush 5-10 mL (has no administration in time range)  
vancomycin (VANCOCIN) 1500 mg in  ml infusion (1,500 mg IntraVENous New Bag 6/20/19 0536) piperacillin-tazobactam (ZOSYN) 4.5 g in 0.9% sodium chloride (MBP/ADV) 100 mL MBP (0 g IntraVENous IV Completed 6/20/19 0606) levoFLOXacin (LEVAQUIN) 750 mg in D5W IVPB (750 mg IntraVENous New Bag 6/20/19 0604)  
nitroglycerin (NITROSTAT) tablet 0.4 mg (0.4 mg SubLINGual Given 6/20/19 0536) Vancomycin- Pharmacy to dose (has no administration in time range)  
vancomycin (VANCOCIN) 1,000 mg in 0.9% sodium chloride 250 mL IVPB (has no administration in time range)  
insulin regular (NOVOLIN R, HUMULIN R) injection 10 Units (has no administration in time range) iopamidol (ISOVUE 300) 61 % contrast injection 100 mL (has no administration in time range)  
ondansetron (ZOFRAN) injection 4 mg (4 mg IntraVENous Given 6/20/19 4005)  
sodium chloride 0.9 % bolus infusion 500 mL (500 mL IntraVENous New Bag 6/20/19 0172)  
sodium chloride 0.9 % bolus infusion 1,000 mL (1,000 mL IntraVENous New Bag 6/20/19 0506) Followed by  
sodium chloride 0.9 % bolus infusion 1,000 mL (1,000 mL IntraVENous New Bag 6/20/19 0589)  
acetaminophen (TYLENOL) tablet 650 mg (650 mg Oral Given 6/20/19 0536) famotidine (PF) (PEPCID) injection 20 mg (20 mg IntraVENous Given 6/20/19 0536) ondansetron [facility]) injection 4 mg (4 mg IntraVENous Given 6/20/19 0552) promethazine (PHENERGAN) 25 mg in 0.9% sodium chloride 50 mL IVPB (25 mg IntraVENous New Bag 6/20/19 0575) ED Course:  
4:50 AM  
Initial assessment performed. The patients presenting problems have been discussed, and they are in agreement with the care plan formulated and outlined with them. I have encouraged them to ask questions as they arise throughout their visit. Diagnosis and Disposition CRITICAL CARE NOTE: 
7:59 AM 
I have spent 120 minutes of critical care time involved in lab review, consultations with specialist, family decision-making, and documentation. During this entire length of time I was immediately available to the patient. Critical Care: The reason for providing this level of medical care for this critically ill patient was due a critical illness that impaired one or more vital organ systems such that there was a high probability of imminent or life threatening deterioration in the patients condition. This care involved high complexity decision making to assess, manipulate, and support vital system functions, to treat this degreee vital organ system failure and to prevent further life threatening deterioration of the patients condition. Core Measures: 
For Hospitalized Patients: 
 
1. Hospitalization Decision Time: The decision to hospitalize the patient was made by Radha Hoyt MD at 4:30 AM on 6/20/2019 2. Aspirin: Aspirin was not given because the patient is a bleeding risk 6:59 AM  Patient is being admitted to the hospital by Dr. Jack Box. The results of their tests and reasons for their admission have been discussed with them and/or available family. They convey agreement and understanding for the need to be admitted and for their admission diagnosis.    
 
CONSULT NOTE:  
6:45 AM 
 Alyssia Yin MD spoke  with Dr. Lisa Quan Specialty:  Gastroenterology Discussed pt's hx, disposition, and available diagnostic and imaging results  over the telephone. Reviewed care plans. Consulting physician agrees with plans as outlined. Be prepared for transfusion (blood was already ordered). He will come see the patient. CONSULT NOTE:  
7:03 AM 
Alyssia Yin MD spoke with Dr. Douglas Barajas Specialty: Cardiology Discussed pt's hx, disposition, and available diagnostic and imaging results  over the telephone. Reviewed care plans. Consulting physician agrees with plans as outlined. He is not a cardiac cath candidate due to bleeding, INR levels and, most importantly, inability to follow cath by anti-platelet therapy. CONSULT NOTE:  
7:45 AM 
Alyssia Yin MD spoke with Dr. Francisco Glover Specialty:  Intensivist 
Discussed pt's hx, disposition, and available diagnostic and imaging results  over the telephone. Reviewed care plans. Consulting physician agrees with plans as outlined. He will come see the patient. CONSULT NOTE:  
7:03 AM 
Alyssia Yin MD spoke with Dr. Pee Marcial Specialty: Hospitalist 
Discussed pt's hx, disposition, and available diagnostic and imaging results  over the telephone. Reviewed care plans. Consulting physician agrees with plans as outlined. CONDITIONS ON ADMISSION: 
Sepsis is not present at the time of admission. Deep Vein Thrombosis is not present at the time of admission. Thrombosis is present at the time of admission. Urinary Tract Infection is not present at the time of admission. Pneumonia is not present at the time of admission. MRSA is not present at the time of admission. Wound infection is not present at the time of admission. Pressure Ulcer is not present at the time of admission. CLINICAL IMPRESSION: 
 
1. Electrocardiogram suggestive of ST elevation myocardial infarction (STEMI) ([facility] [address] 2. Gastrointestinal hemorrhage with melena 3. Severe anemia 4. Anticoagulated 5. Coagulopathy ([address] 6. Shock circulatory ([address]   
 
 
_______________________________ This note is prepared by Malik Parks, acting as Scribe for Mani. Danish Madden MD. [employer]. Danish Madden MD:  The scribe's documentation has been prepared under my direction and personally reviewed by me in its entirety. I confirm that the note above accurately reflects all work, treatment, procedures, and medical decision making performed by me.

## 2019-06-20 NOTE — PROGRESS NOTES
RESPIRATORY NOTE: 
Bagging pt with 100% O2 during CPR, pulse obtained, initiated 840 ventilator with 100% FIO2, RR increased to 20 bpm per MD, transferred to ICU without incident, will monitor.

## 2019-06-20 NOTE — PROGRESS NOTES
Pharmacy Dosing Services: Vancomycin Consult for Vancomycin Dosing by Pharmacy by Dr. Danish Madden Consult provided for this 58y.o. year old male , for indication of Sepsis. Day of Therapy 1 Ht Readings from Last 1 Encounters:  
06/20/19 167.6 cm (66\") Wt Readings from Last 1 Encounters:  
06/20/19 88.5 kg (195 lb) Other Current Antibiotics Levaquin + Zosyn Significant Cultures Pending Serum Creatinine Lab Results Component Value Date/Time Creatinine 1.21 06/20/2019 04:05 AM  
 
  
Creatinine Clearance Estimated Creatinine Clearance: 64.3 mL/min (based on SCr of 1.21 mg/dL). BUN Lab Results Component Value Date/Time BUN 50 (H) 06/20/2019 04:05 AM  
 
  
WBC Lab Results Component Value Date/Time WBC 21.0 (H) 06/20/2019 04:05 AM  
 
  
H/H Lab Results Component Value Date/Time HGB 8.0 (L) 06/20/2019 04:05 AM  
 
  
Platelets Lab Results Component Value Date/Time PLATELET 471 17/75/6118 04:05 AM  
 
  
Temp 97.9 °F (36.6 °C) Start Vancomycin therapy, with loading dose of 1500 mg at 0600 6/20/19. Follow with maintenance dose of 1000 mg at 1800 6/20/19, every 12 hours. Dose calculated to approximate a therapeutic trough of 15-20 mcg/mL. Pharmacy to follow daily and will make changes to dose and/or frequency based on clinical status.  
 
Pharmacist [id number] [facility] [address] Faviola Anthony

## 2019-06-20 NOTE — H&P
History & Physical 
 
Patient: Guilherme Persaud MRN: [id number]  CSN: [id number] YOB: 1955  Age: 58 y.o. Sex: male DOA: 6/20/2019 Primary Care Provider:  Shun Murphy MD 
 
 
Assessment/Plan Patient Active Problem List  
Diagnosis Code  NSTEMI (non-ST elevated myocardial infarction) ([facility] I21.4  Acute respiratory failure with hypoxemia (HCC) J96.01  Shock, cardiogenic ([address] R57.0  Coagulopathy ([address] D68.9  
 Gastrointestinal hemorrhage with hematemesis K92.0  Cardiomyopathy ([facility] [address] I42.9  CAD (coronary artery disease) I25.10  Lactic acidosis E87.2  Anemia due to GI blood loss D50.0  Metabolic acidosis H51.9  Systolic CHF, acute on chronic (HCC) I50.23  Cardiac arrest ([facility] [address] I46.9 Admit to ICU CRITICAL CARE PLAN Resp - Acute respiratory failure - orally intubated, sedated. multifactorial 
See vent orders, VAP bundle. HOB>30 degrees. ID - Leukocytosis - unclear source, possible reactive Hold on to antibiotics for now. CVS - Monitor HD. Wean pressors, levo, phenylephrine, dopamine for MAP>65. Cardiac arrest - see code blue note. Shock - hypovolemic and cardiogenic. NSTEMI - EKG changes with ST depression. Follow cardiac enzymes. Acute on chronic systolic CHF -  
CAD with history of CABG x2 in 1994. Discussed with Dr. Cyndi Wong, not a candidate for cath or baloon pump. Heme/onc - Follow H&H, plts. INR. Anemia - acute blood loss anemia, from acute GI bleed. Getting blood transfusion. Check H/H q 6 hours Coagulopathy - secondary to coumadin, Received Tatiana Lower Chronic thrombocytopenia - platelets now in normal range Renal - Trend BUN, Cr, follow I/O, whyte in place. Check and replace Mg, K, phos. Endocrine -  Follow FSG 
DM - on SSI Neuro/ Pain/ Sedation -  Sedation bundle. History of CVA - on coumadin GI - NPO for now. Acute GI bleed - patient unstable to do EGD. Prophylaxis - DVT - INR therapeutic, GI: protonix Called and discussed with son on phone, he is in [place], will be coming today. Discussed poor prognosis and critical condition. CC time - 1010 - 1205 
115 minutes of critical care time spent in the direct evaluation and treatment of this high risk patient. The reason for providing this level of medical care for this critically ill patient was due a critical illness that impaired one or more vital organ systems such that there was a high probability of imminent or life threatening deterioration in the patients condition. This care involved high complexity decision making to assess, manipulate, and support vital system functions, to treat this degreee vital organ system failure and to prevent further life threatening deterioration of the patients condition. Estimated length of stay : TBD 
 
CC: nausea/vomiting, SOB, dizziness HPI:  
 
Jonatan Lee is a 58 y.o. male who has past history of CAD, CABG, CHF, DM, HTN, CVA, thrombocytopenia presented to ER with concerns of N/V, SOB, chest pain, dizziness. History is obtained from chart as patient is orally intubated and sedated. Most of the history obtained from chart and speaking to son. Patient was in ER and work up was being done. He complained of abdominal pain and was getting CT. When he returned to ER bed he was more SOB and hypotensive and required to be intubated and started on pressors. His wbc elevated at 21, initial H/H 8/25.3 and follow up H/H 6.4/20. 6. ABG -  6.953/28.7/118/6.3/95. Initial troponin 0.21 and repeat at 0.75. Code blue called on this patient for bradycardia and subsequent PEA. Patient  had CABG x 2 in 1994 at [facility] in [place]. His cardiologist is Dr. Eloise Madrid at [address] [facility]. 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 CHF with EF of 35%.   
Son reports that patient had few MI that he did not seek medical attention. He was a smoker until 11 and relapsed in  until CVA in . Son not sure if he has been completely abstained from smoking now. Past Medical History:  
Diagnosis Date  CAD (coronary artery disease)  Chronic systolic CHF (congestive heart failure) ([facility]  CVA (cerebral vascular accident) ([facility] [facility]   Diabetes ([facility]  Hyperlipidemia  Hypertension  Thrombocytopenia ([facility] Past Surgical History:  
Procedure Laterality Date  HX CORONARY ARTERY BYPASS GRAFT  1994  
 x 2 History reviewed. No pertinent family history. Social History Socioeconomic History  Marital status:  Spouse name: Not on file  Number of children: Not on file  Years of education: Not on file  Highest education level: Not on file Tobacco Use  Smoking status: Former Smoker  Smokeless tobacco: Never Used Substance and Sexual Activity  Alcohol use: Not Currently  Drug use: Never Prior to Admission medications Not on File No Known Allergies Review of Systems Unable to obtain Physical Exam:  
 
Physical Exam: 
Visit Vitals BP (!) 79/54 Pulse 97 Temp 96.7 °F (35.9 °C) Resp 20 Ht 5' 6\" (1.676 m) Wt 88.5 kg (195 lb) SpO2 94% BMI 31.47 kg/m² O2 Device: Room air Temp (24hrs), Av °F (36.1 °C), Min:96.5 °F (35.8 °C), Max:97.9 °F (36.6 °C) No intake/output data recorded.  1901 -  0700 In: 150 [I.V.:150] Out: - General:  As above. Head:  Normocephalic, without obvious abnormality, atraumatic. Eyes:  Conjunctivae/corneas clear, sclera anicteric, PERRL. Nose: Nares normal. No drainage or sinus tenderness. Throat: Lips, mucosa, and tongue normal.   
Neck: Supple, symmetrical, trachea midline, no adenopathy. Lungs:   Clear to auscultation bilaterally. Heart:   S1, S2, no murmur, click, rub or gallop. Abdomen: Soft, non-tender.  Bowel sounds normal. No masses,  No organomegaly. Extremities: Extremities normal, atraumatic, no cyanosis or edema. Capillary refill normal.  
Pulses: Very weak pulses. Skin: Skin color pink, turgor normal. No rashes or lesions Neurologic: . Labs Reviewed: 
 
CMP:  
Lab Results Component Value Date/Time  (L) 06/20/2019 04:05 AM  
 K 4.7 06/20/2019 04:05 AM  
  06/20/2019 04:05 AM  
 CO2 16 (L) 06/20/2019 04:05 AM  
 AGAP 18 06/20/2019 04:05 AM  
  (H) 06/20/2019 04:05 AM  
 BUN 50 (H) 06/20/2019 04:05 AM  
 CREA 1.21 06/20/2019 04:05 AM  
 GFRAA >60 06/20/2019 04:05 AM  
 GFRNA >60 06/20/2019 04:05 AM  
 CA 8.4 (L) 06/20/2019 04:05 AM  
 MG 2.4 06/20/2019 04:05 AM  
 ALB 3.4 06/20/2019 04:05 AM  
 TP 6.2 (L) 06/20/2019 04:05 AM  
 GLOB 2.8 06/20/2019 04:05 AM  
 AGRAT 1.2 06/20/2019 04:05 AM  
 SGOT 22 06/20/2019 04:05 AM  
 ALT 27 06/20/2019 04:05 AM  
 
CBC:  
Lab Results Component Value Date/Time WBC 21.5 (H) 06/20/2019 08:36 AM  
 HGB 6.4 (L) 06/20/2019 08:36 AM  
 HCT 20.6 (L) 06/20/2019 08:36 AM  
  06/20/2019 08:36 AM  
 
All Cardiac Markers in the last 24 hours:  
Lab Results Component Value Date/Time  06/20/2019 08:35 AM  
 CPK 80 06/20/2019 04:05 AM  
 CKMB 7.7 (H) 06/20/2019 08:35 AM  
 CKMB 4.0 (H) 06/20/2019 04:05 AM  
 CKND1 6.9 (H) 06/20/2019 08:35 AM  
 CKND1 5.0 (H) 06/20/2019 04:05 AM  
 TROIQ 0.75 (H) 06/20/2019 08:35 AM  
 TROIQ 0.21 (H) 06/20/2019 04:05 AM  
 
ABG:  
Lab Results Component Value Date/Time PHI 6.907 (LL) 06/20/2019 11:23 AM  
 PCO2I 41.8 06/20/2019 11:23 AM  
 PO2I 100 06/20/2019 11:23 AM  
 HCO3I 8.3 (L) 06/20/2019 11:23 AM  
 FIO2I 100 06/20/2019 11:23 AM  
 
COAGS:  
Lab Results Component Value Date/Time  APTT 53.1 (H) 06/20/2019 04:40 AM  
 PTP 31.8 (H) 06/20/2019 04:40 AM  
 INR 3.0 (H) 06/20/2019 04:40 AM  
 
 
 
Procedures/imaging: see electronic medical records for all procedures/Xrays and details which were not copied into this note but were reviewed prior to creation of Plan CC: Angella Trivedi MD

## 2019-06-20 NOTE — PROGRESS NOTES
Reason for Admission:   C/o Nausea,vomiting, dizziness RRAT Score:   3 Plan for utilizing home health:   TBD Current Advanced Directive/Advance Care Plan: not on chart recommend palliative consult pt on vent Transition of Care Plan:   Chart reviewed per ED note pt arrived to ED with initial c/o nausea,vomiting,dizziness, while in ED developed SOB , intubated and will be admitted to ICU, unit cm will follow thru with d/c planning once more stable

## 2019-06-20 NOTE — CONSULTS
Cardiolology  Inpatient Consult Patient: Trent Jo               Sex: male          DOA: 6/20/2019 YOB: 1955      Age:  58 y.o.        LOS:  LOS: 0 days Trent Jo is a 58 y.o. male admitted for STEMI (ST elevation myocardial infarction) ([facility] [I21.3] Recommendations: · Volume resuscitation . .. Blood, FFP, fluids · Echo · Follow Impression: · Hypovolemic shock · Coagulopathy · GI bleed · No evidence for STEMI 
· Other problems as enumerated below Patient Active Problem List  
 Diagnosis Date Noted  NSTEMI (non-ST elevated myocardial infarction) ([facility] [address] 06/20/2019  Acute respiratory failure with hypoxemia ([facility] 06/20/2019  Shock, cardiogenic ([facility] 06/20/2019  Coagulopathy ([facility] 06/20/2019  Gastrointestinal hemorrhage with hematemesis 06/20/2019  Cardiomyopathy ([facility] [address] 06/20/2019  CAD (coronary artery disease) 06/20/2019  Lactic acidosis 06/20/2019  Anemia due to GI blood loss 06/20/2019 Priscilla Greenwood MD 
Past Medical History:  
Diagnosis Date  Diabetes ([facility]  Heart failure ([facility]  Hypertension History reviewed. No pertinent surgical history. No Known Allergies History reviewed. No pertinent family history. Current Facility-Administered Medications Medication Dose Route Frequency  sodium chloride (NS) flush 5-10 mL  5-10 mL IntraVENous PRN  
 levoFLOXacin (LEVAQUIN) 750 mg in D5W IVPB  750 mg IntraVENous Q24H  
 nitroglycerin (NITROSTAT) tablet 0.4 mg  0.4 mg SubLINGual PRN  Vancomycin- Pharmacy to dose  1 Each Other Rx Dosing/Monitoring  vancomycin (VANCOCIN) 1,000 mg in 0.9% sodium chloride 250 mL IVPB  1,000 mg IntraVENous Q12H  
 insulin regular (NOVOLIN R, HUMULIN R) injection 10 Units  10 Units IntraVENous NOW  morphine injection 2 mg  2 mg IntraVENous NOW  morphine injection 2 mg  2 mg IntraVENous ONCE  
 0.9% sodium chloride infusion 250 mL  250 mL IntraVENous PRN  
  NOREPINephrine (LEVOPHED) 8 mg in dextrose 5% 250 mL infusion  2-16 mcg/min IntraVENous TITRATE  fentaNYL citrate (PF) injection 50 mcg  50 mcg IntraVENous Q4H PRN  chlorhexidine (PERIDEX) 0.12 % mouthwash 10 mL  10 mL Oral Q12H  
 midazolam in normal saline (VERSED) 2 mg/mL infusion  1-5 mg/hr IntraVENous TITRATE  phytonadione (vitamin K1) (AQUA-MEPHYTON) injection 2.5 mg  2.5 mg SubCUTAneous ONCE  
 sodium bicarbonate (8.4%) 150 mEq in sterile water 1,000 mL infusion   IntraVENous CONTINUOUS  
 PHENYLephrine (BESS-SYNEPHRINE) 30 mg in 0.9% sodium chloride 250 mL infusion   mcg/min IntraVENous TITRATE  
 0.9% sodium chloride infusion 250 mL  250 mL IntraVENous PRN  pantoprazole (PROTONIX) 40 mg in sodium chloride 0.9% 10 mL injection  40 mg IntraVENous Q12H  
 DOPamine (INTROPIN) 800 mg in dextrose 5% 500 mL infusion  5-20 mcg/kg/min IntraVENous TITRATE  hydrocortisone Sod Succ (PF) (SOLU-CORTEF) injection 100 mg  100 mg IntraVENous ONCE  hydrocortisone Sod Succ (PF) (SOLU-CORTEF) injection 50 mg  50 mg IntraVENous Q6H  
 meropenem (MERREM) 1 g in sterile water (preservative free) 20 mL IV syringe  1 g IntraVENous Q8H No current outpatient medications on file. Review of Symptoms: 
Review of systems not obtained due to patient factors. Subjective: 
Brisa Nicole is a 58 y.o. male with PMHx of DM, HTN, and heart failure who presents to the emergency department with complaint of nausea and vomiting, onset yesterday. Associated symptoms include dizziness, SOB, chest pain, and jaw pain. He states that his symptoms started with dizziness, described as room spinning, 4 days ago. He describes as emesis as \"dark. \" Patient is on Coumadin and takes ASA. He is a former smoker. No  EtOH, or illicit drug use. Patient denies diarrhea, abdominal pain, fever, or any other sxs or complaints.  Since presentation to ED the patient has been intubated and is on a vent. He is hypotensive and appears moribund. His  EKG shows ST depression; there is no evidence of STEMI. . 
Cardiac risk factors: extant. Physical Exam 
 
Visit Vitals BP (!) 43/17 Pulse (!) 55 Temp 96.7 °F (35.9 °C) Resp 14 Ht 5' 6\" (1.676 m) Wt 88.5 kg (195 lb) SpO2 100% BMI 31.47 kg/m² Comfortable; on vent support; Fio2 at 40%; acyanotic HEENT: pupils not dilated, reactive, no scleral jaundice, moist oral mucosa, no nasal drainage Neck: No adenopathy or thyroid swelling CVS: S1S2 no murmurs; JVD not elevated RS: Mod air entry bilaterally, decreased BS at bases, no wheezes or crackles Abd: soft, non tender, no hepatosplenomegaly, no abd distension, no guarding or rigidity, bowel sounds heard Neuro: intubated, sedated, limited exam 
Extrm: no leg edema or swelling or clubbing Skin: no rash Lymphatic: no cervical or supraclavicular adenopathy RT SV central line 
  
 
 
   
   
   
   
   
   
   
   
Cardiographics Telemetry: Sinus tach ECG: marked ST depression Echocardiogram: Not done Recent radiology, intake/output and wt reviewed Labs:  
Recent Results (from the past 48 hour(s)) EKG, 12 LEAD, INITIAL Collection Time: 06/20/19  4:03 AM  
Result Value Ref Range Ventricular Rate 97 BPM  
 Atrial Rate 97 BPM  
 P-R Interval 186 ms QRS Duration 72 ms Q-T Interval 360 ms QTC Calculation (Bezet) 457 ms Calculated P Axis 70 degrees Calculated R Axis 71 degrees Calculated T Axis -109 degrees Diagnosis Normal sinus rhythm Marked ST abnormality, possible inferior subendocardial injury Abnormal ECG No previous ECGs available Confirmed by Antolin Aguiar MD, -- ([id number]) on 6/20/2019 9:35:54 AM 
  
CBC WITH AUTOMATED DIFF Collection Time: 06/20/19  4:05 AM  
Result Value Ref Range WBC 21.0 (H) 4.6 - 13.2 K/uL  
 RBC 2.99 (L) 4.70 - 5.50 M/uL HGB 8.0 (L) 13.0 - 16.0 g/dL HCT 25.3 (L) 36.0 - 48.0 % MCV 84.6 74.0 - 97.0 FL  
 MCH 26.8 24.0 - 34.0 PG  
 MCHC 31.6 31.0 - 37.0 g/dL  
 RDW 16.6 (H) 11.6 - 14.5 % PLATELET 147 835 - 727 K/uL MPV 12.4 (H) 9.2 - 11.8 FL  
 NEUTROPHILS 81 (H) 40 - 73 % LYMPHOCYTES 14 (L) 21 - 52 % MONOCYTES 5 3 - 10 % EOSINOPHILS 0 0 - 5 % BASOPHILS 0 0 - 2 %  
 ABS. NEUTROPHILS 16.8 (H) 1.8 - 8.0 K/UL  
 ABS. LYMPHOCYTES 3.0 0.9 - 3.6 K/UL  
 ABS. MONOCYTES 1.1 0.05 - 1.2 K/UL  
 ABS. EOSINOPHILS 0.0 0.0 - 0.4 K/UL  
 ABS. BASOPHILS 0.0 0.0 - 0.1 K/UL  
 DF AUTOMATED METABOLIC PANEL, COMPREHENSIVE Collection Time: 06/20/19  4:05 AM  
Result Value Ref Range Sodium 135 (L) 136 - 145 mmol/L Potassium 4.7 3.5 - 5.5 mmol/L Chloride 101 100 - 108 mmol/L  
 CO2 16 (L) 21 - 32 mmol/L Anion gap 18 3.0 - 18 mmol/L Glucose 234 (H) 74 - 99 mg/dL BUN 50 (H) 7.0 - 18 MG/DL Creatinine 1.21 0.6 - 1.3 MG/DL  
 BUN/Creatinine ratio 41 (H) 12 - 20 GFR est AA >60 >60 ml/min/1.73m2 GFR est non-AA >60 >60 ml/min/1.73m2 Calcium 8.4 (L) 8.5 - 10.1 MG/DL Bilirubin, total 0.3 0.2 - 1.0 MG/DL  
 ALT (SGPT) 27 16 - 61 U/L  
 AST (SGOT) 22 15 - 37 U/L Alk. phosphatase 44 (L) 45 - 117 U/L Protein, total 6.2 (L) 6.4 - 8.2 g/dL Albumin 3.4 3.4 - 5.0 g/dL Globulin 2.8 2.0 - 4.0 g/dL A-G Ratio 1.2 0.8 - 1.7 CARDIAC PANEL,(CK, CKMB & TROPONIN) Collection Time: 06/20/19  4:05 AM  
Result Value Ref Range CK 80 39 - 308 U/L  
 CK - MB 4.0 (H) <3.6 ng/ml CK-MB Index 5.0 (H) 0.0 - 4.0 % Troponin-I, QT 0.21 (H) 0.0 - 0.045 NG/ML  
NT-PRO BNP Collection Time: 06/20/19  4:05 AM  
Result Value Ref Range NT pro- 0 - 900 PG/ML  
MAGNESIUM Collection Time: 06/20/19  4:05 AM  
Result Value Ref Range Magnesium 2.4 1.6 - 2.6 mg/dL PROTHROMBIN TIME + INR Collection Time: 06/20/19  4:40 AM  
Result Value Ref Range Prothrombin time 31.8 (H) 11.5 - 15.2 sec INR 3.0 (H) 0.8 - 1.2 PTT Collection Time: 06/20/19  4:40 AM  
Result Value Ref Range aPTT 53.1 (H) 23.0 - 36.4 SEC POC LACTIC ACID Collection Time: 06/20/19  4:56 AM  
Result Value Ref Range Lactic Acid (POC) 4.10 (HH) 0.40 - 2.00 mmol/L  
CULTURE, BLOOD Collection Time: 06/20/19  4:57 AM  
Result Value Ref Range Special Requests: NO SPECIAL REQUESTS Culture result: NO GROWTH AFTER 1 HOUR    
OCCULT BLOOD, STOOL Collection Time: 06/20/19  5:20 AM  
Result Value Ref Range Occult blood, stool POSITIVE (A) NEG    
TYPE & SCREEN Collection Time: 06/20/19  5:25 AM  
Result Value Ref Range Crossmatch Expiration 06/23/2019 ABO/Rh(D) O POSITIVE Antibody screen NEG   
 CALLED TO: HA AVILA RN ED ON 6/20/2019 0847 BY [facility] Unit number [id number] Blood component type [facility] Unit division 00 Status of unit ISSUED Crossmatch result Compatible Unit number [id number] Blood component type  LR Unit division 00 Status of unit ISSUED Crossmatch result Compatible Unit number [id number] Blood component type  LR Unit division 00 Status of unit ALLOCATED Crossmatch result Compatible EKG, 12 LEAD, INITIAL Collection Time: 06/20/19  6:59 AM  
Result Value Ref Range Ventricular Rate 124 BPM  
 Atrial Rate 119 BPM  
 QRS Duration 82 ms Q-T Interval 322 ms QTC Calculation (Bezet) 462 ms Calculated R Axis 63 degrees Calculated T Axis -99 degrees Diagnosis Supraventricular tachycardia , suspect sinus Low voltage QRS Cannot rule out Anteroseptal infarct , age undetermined Marked ST abnormality, possible inferior subendocardial injury Abnormal ECG When compared with ECG of 20-JUN-2019 04:03, 
Junctional rhythm has replaced Sinus rhythm Minimal criteria for Anteroseptal infarct are now present ST more depressed in Inferior leads Confirmed by Triny Cuello MD, -- ([id number]) on 6/20/2019 9:38:12 AM 
  
 URINALYSIS W/ RFLX MICROSCOPIC Collection Time: 06/20/19  7:00 AM  
Result Value Ref Range Color YELLOW Appearance CLEAR Specific gravity 1.030 1.005 - 1.030    
 pH (UA) 5.0 5.0 - 8.0 Protein NEGATIVE  NEG mg/dL Glucose >1,000 (A) NEG mg/dL Ketone 15 (A) NEG mg/dL Bilirubin NEGATIVE  NEG Blood NEGATIVE  NEG Urobilinogen 0.2 0.2 - 1.0 EU/dL Nitrites NEGATIVE  NEG Leukocyte Esterase NEGATIVE  NEG    
CARDIAC PANEL,(CK, CKMB & TROPONIN) Collection Time: 06/20/19  8:35 AM  
Result Value Ref Range  39 - 308 U/L  
 CK - MB 7.7 (H) <3.6 ng/ml CK-MB Index 6.9 (H) 0.0 - 4.0 % Troponin-I, QT 0.75 (H) 0.0 - 0.045 NG/ML  
CBC WITH AUTOMATED DIFF Collection Time: 06/20/19  8:36 AM  
Result Value Ref Range WBC 21.5 (H) 4.6 - 13.2 K/uL  
 RBC 2.36 (L) 4.70 - 5.50 M/uL HGB 6.4 (L) 13.0 - 16.0 g/dL HCT 20.6 (L) 36.0 - 48.0 % MCV 87.3 74.0 - 97.0 FL  
 MCH 27.1 24.0 - 34.0 PG  
 MCHC 31.1 31.0 - 37.0 g/dL  
 RDW 16.7 (H) 11.6 - 14.5 % PLATELET 311 487 - 035 K/uL MPV 11.9 (H) 9.2 - 11.8 FL  
 NEUTROPHILS 78 (H) 40 - 73 % LYMPHOCYTES 18 (L) 21 - 52 % MONOCYTES 4 3 - 10 % EOSINOPHILS 0 0 - 5 % BASOPHILS 0 0 - 2 %  
 ABS. NEUTROPHILS 16.9 (H) 1.8 - 8.0 K/UL  
 ABS. LYMPHOCYTES 3.8 (H) 0.9 - 3.6 K/UL  
 ABS. MONOCYTES 0.8 0.05 - 1.2 K/UL  
 ABS. EOSINOPHILS 0.0 0.0 - 0.4 K/UL  
 ABS. BASOPHILS 0.0 0.0 - 0.1 K/UL  
 DF AUTOMATED    
POC G3 Collection Time: 06/20/19  9:08 AM  
Result Value Ref Range Device: VENT    
 FIO2 (POC) 40 % pH (POC) 6.953 (LL) 7.35 - 7.45    
 pCO2 (POC) 28.7 (L) 35.0 - 45.0 MMHG  
 pO2 (POC) 118 (H) 80 - 100 MMHG  
 HCO3 (POC) 6.3 (L) 22 - 26 MMOL/L  
 sO2 (POC) 95 92 - 97 % Base deficit (POC) 26 mmol/L Mode ASSIST CONTROL Tidal volume 450 ml Set Rate 14 bpm  
 PEEP/CPAP (POC) 5 cmH2O Allens test (POC) YES Inspiratory Time 1.5 sec Total resp. rate 18  Site LEFT BRACHIAL    
 Specimen type (POC) ARTERIAL Performed by [facility]  Volume control YES    
 
 
 
 
Isrrael Dugan MD

## 2019-06-21 PROBLEM — N17.9 ACUTE RENAL FAILURE (HCC): Status: ACTIVE | Noted: 2019-01-01

## 2019-06-21 PROBLEM — G93.1 ANOXIC ENCEPHALOPATHY (HCC): Status: ACTIVE | Noted: 2019-01-01

## 2019-06-21 PROBLEM — K72.00 SHOCK LIVER: Status: ACTIVE | Noted: 2019-01-01

## 2019-06-21 NOTE — PROGRESS NOTES
ABG drawn due to patient's SPO2 reading low low 90s on monitor. Post ABG results, oxygenation, PH, and HCO3 improved. Decreased PEEP from [address] to 5cmH2O due to improved oxygenation.

## 2019-06-21 NOTE — PROGRESS NOTES
visited with the family of Mac Fischer, who is a 58 y.o.,male. The  provided the following Interventions: 
Initiated a relationship of care and support. Offered  assurance of continued prayer on patient's behalf. Patient's son Oumou Sharma, arrived and  offered pastoral support. Oumou Sharma stated it was very important that someone come in whenever possible and talk to his father and tell him Jossue Lee is loved and to keep fighting. \" [address] stated to him, that will be done whenever possible.  let him know we are here to support him and his mother during this time. Plan: 
Chaplains will continue to follow and will provide pastoral care on an as needed/requested basis.  recommends bedside caregivers page  on duty if patient shows signs of acute spiritual or emotional distress. Rev. Amara Mills,[facility] 
[phone]

## 2019-06-21 NOTE — CONSULTS
NEUROLOGY CONSULTATION NOTE Patient: Aftab Servin MRN: [id number]  CSN: [id number] YOB: 1955  Age: 58 y.o. Sex: male DOA: 6/20/2019 LOS:  LOS: 1 day Requesting Physician: Dr. Mimi Jose Reason for Consultation: Unresponsiveness, anoxic brain injury? HISTORY OF PRESENT ILLNESS:  
Aftab Servin is a 71-year-old male with history of diabetes, hypertension and congestive heart failure (EF 35%), who presented initially with nausea and vomiting as well as dizziness and chest pain. He was having dark colored vomiting. He was hypotensive. He went into PEA arrest, requiring CPR. Neurology was consulted due to unresponsiveness. The patient is intubated and unresponsive. History was obtained from the chart. PAST MEDICAL HISTORY: 
Past Medical History:  
Diagnosis Date  CAD (coronary artery disease)  Chronic systolic CHF (congestive heart failure) ([facility]  CVA (cerebral vascular accident) ([facility] [address] 2015  Diabetes ([facility] [address]  Hyperlipidemia  Hypertension  Thrombocytopenia ([facility] [address] PAST SURGICAL HISTORY: 
Past Surgical History:  
Procedure Laterality Date  HX CORONARY ARTERY BYPASS GRAFT  1994  
 x 2 FAMILY HISTORY: 
History reviewed. No pertinent family history. SOCIAL HISTORY: 
Social History Socioeconomic History  Marital status:  Spouse name: Not on file  Number of children: Not on file  Years of education: Not on file  Highest education level: Not on file Tobacco Use  Smoking status: Former Smoker  Smokeless tobacco: Never Used Substance and Sexual Activity  Alcohol use: Not Currently  Drug use: Never MEDICATIONS: 
Current Facility-Administered Medications Medication Dose Route Frequency  insulin lispro (HUMALOG) injection   SubCUTAneous Q6H  
 glucose chewable tablet 16 g  4 Tab Oral PRN  
 glucagon (GLUCAGEN) injection 1 mg  1 mg IntraMUSCular PRN  
  dextrose 10% infusion 125-250 mL  125-250 mL IntraVENous PRN  
 sodium chloride (NS) flush 5-10 mL  5-10 mL IntraVENous PRN  
 levoFLOXacin (LEVAQUIN) 750 mg in D5W IVPB  750 mg IntraVENous Q24H  
 nitroglycerin (NITROSTAT) tablet 0.4 mg  0.4 mg SubLINGual PRN  Vancomycin- Pharmacy to dose  1 Each Other Rx Dosing/Monitoring  vancomycin (VANCOCIN) 1,000 mg in 0.9% sodium chloride 250 mL IVPB  1,000 mg IntraVENous Q12H  
 0.9% sodium chloride infusion 250 mL  250 mL IntraVENous PRN  
 NOREPINephrine (LEVOPHED) 8 mg in dextrose 5% 250 mL infusion  2-16 mcg/min IntraVENous TITRATE  fentaNYL citrate (PF) injection 50 mcg  50 mcg IntraVENous Q4H PRN  chlorhexidine (PERIDEX) 0.12 % mouthwash 10 mL  10 mL Oral Q12H  
 sodium bicarbonate (8.4%) 150 mEq in sterile water 1,000 mL infusion   IntraVENous CONTINUOUS  
 PHENYLephrine (BESS-SYNEPHRINE) 30 mg in 0.9% sodium chloride 250 mL infusion   mcg/min IntraVENous TITRATE  
 0.9% sodium chloride infusion 250 mL  250 mL IntraVENous PRN  pantoprazole (PROTONIX) 40 mg in sodium chloride 0.9% 10 mL injection  40 mg IntraVENous Q12H  
 DOPamine (INTROPIN) 800 mg in dextrose 5% 500 mL infusion  5-20 mcg/kg/min IntraVENous TITRATE  hydrocortisone Sod Succ (PF) (SOLU-CORTEF) injection 50 mg  50 mg IntraVENous Q6H  
 meropenem (MERREM) 1 g in sterile water (preservative free) 20 mL IV syringe  1 g IntraVENous Q8H  
 0.9% sodium chloride infusion 250 mL  250 mL IntraVENous PRN  
 ELECTROLYTE REPLACEMENT PROTOCOL - Potassium Standard Dosing   1 Each Other PRN  
 ELECTROLYTE REPLACEMENT PROTOCOL - Magnesium   1 Each Other PRN  
 ELECTROLYTE REPLACEMENT PROTOCOL - Phosphorus  Standard Dosing  1 Each Other PRN  
 ELECTROLYTE REPLACEMENT PROTOCOL - Calcium   1 Each Other PRN  
 0.9% sodium chloride infusion 250 mL  250 mL IntraVENous PRN  
 albumin human 25% (BUMINATE) solution 12.5 g  12.5 g IntraVENous Q6H  
  EPINEPHrine (ADRENALIN) 8 mg in 0.9% sodium chloride 250 mL infusion  1-10 mcg/min IntraVENous TITRATE Prior to Admission medications Not on File ALLERGIES: 
No Known Allergies Review of Systems The patient is unresponsive. I'm not able to review the systems. PHYSICAL EXAMINATION:  
 
Visit Vitals /53 Pulse 100 Temp 97.5 °F (36.4 °C) Resp 24 Ht 5' 6\" (1.676 m) Wt 88.5 kg (195 lb) SpO2 93% BMI 31.47 kg/m² O2 Device: Endotracheal tube, Ventilator GENERAL[de-identified] unresponsive, comatose. HEENT: Moist mucous membranes, ET in. 
CVS: Regular rate and rhythm, dim sounds. PULMONARY: Clear to auscultation anteriorly bilaterally. No rales or rhonchi. No wheezing. EXTREMITIES: Normal range of motion at all sites. Left thumb duplicate deformity. ABDOMEN: Soft, nontender. SKIN: No rashes or ecchymoses. Warm and dry. NEUROLOGIC: The patient is comatose. He's not responding to verbal stimuli or noxious stimuli. Pupils are dilated 8-9mm and not reactive to light. Corneal reflexes are negative. Oculocephalic and ocular vestibular reflexes with cold caloric testing is negative bilaterally. Cough and gag reflexes are negative. He does not withdraw to noxious stimuli at any sides. There is no spontaneous movement. Deep tendon reflexes are absent at all sites. Labs: Results:  
   
Chemistry Recent Labs  
  06/21/19 
0431 06/20/19 
1138 06/20/19 
0405 * 289* 234*  146* 135* K 4.8 4.2 4.7 CL 99* 107 101 CO2 14* 15* 16* BUN 55* 43* 50* CREA 3.78* 1.68* 1.21  
CA 6.7* 6.0* 8.4* AGAP 26* 24* 18  
BUCR 15 26* 41* AP 65 37* 44*  
TP 4.8* 3.5* 6.2* ALB 2.7* 1.8* 3.4 GLOB 2.1 1.7* 2.8 AGRAT 1.3 1.1 1.2  
  
CBC w/Diff Recent Labs  
  06/21/19 
0431 06/20/19 
2315 06/20/19 
1138 06/20/19 
6101 WBC 21.6*  --  20.3* 21.5*  
RBC 3.50*  --  3.36* 2.36* HGB 10.3* 11.4* 9.9*  9.8* 6.4* HCT 31.9* 35.0* 30.7*  30.6* 20.6* PLT 72*  --  64* 206 GRANS 78*  --  75 78* LYMPH 6*  --  16* 18* EOS 0  --  0 0 Cardiac Enzymes Recent Labs  
  06/20/19 
2315 06/20/19 
1138 CPK 4,106* 432* CKND1 10.8* 7.1* Coagulation Recent Labs  
  06/21/19 
0431 06/20/19 
1138 PTP 29.2* 20.0* INR 2.7* 1.7* APTT 49.6* 58.5* Lipid Panel No results found for: CHOL, CHOLPOCT, CHOLX, CHLST, CHOLV, 681786, HDL, LDL, LDLC, DLDLP, 590885, VLDLC, VLDL, TGLX, TRIGL, TRIGP, TGLPOCT, CHHD, CHHDX  
BNP No results for input(s): BNPP in the last 72 hours. Liver Enzymes Recent Labs  
  06/21/19 
0431 TP 4.8* ALB 2.7* AP 65 SGOT 9,638* Thyroid Studies No results found for: T4, T3U, TSH, TSHEXT Radiology: Xr Abd (kub) Result Date: 6/20/2019 EXAM: XR ABD (KUB) INDICATION: OG insertion COMPARISON: None. FINDINGS: A supine radiograph of the abdomen shows a normal bowel gas pattern. Enteric tube tip in stomach. No soft tissue masses or pathologic calcifications are identified. The bones and soft tissues are within normal limits. IMPRESSION: Enteric tube tip in stomach. Unremarkable otherwise. Ct Abd Pelv W Cont Result Date: 6/20/2019 EXAM: CT ABDOMEN AND PELVIS IV ONLY INDICATION: Abdominal pain , nausea and vomiting, dizziness COMPARISON: No prior comparison study TECHNIQUE: Axial CT imaging of the abdomen and pelvis was performed after the administration of only IV contrast as per specific clinician request. Multiplanar reformats were generated. One or more dose reduction techniques were used on this CT: automated exposure control, adjustment of the mAs and/or kVp according to patient's size, and iterative reconstruction techniques. The specific techniques utilized on this CT exam have been documented in the patient's electronic medical record.  Digital Imaging and Communications in Medicine (DICOM) format image data are available to nonaffiliated external healthcare facilities or entities on a secure, media free, reciprocally searchable basis with patient authorization for at least a 12-month period after this study. _______________ FINDINGS: Motion artifact is present which limits evaluation. LOWER CHEST: Mild dependent atelectasis is present in the lung bases. Partially visualized median sternotomy wires are present. There is no pericardial or pleural effusion. LIVER, BILIARY: The liver has diffusely decreased attenuation consistent with hepatic steatosis. No abnormal biliary dilation. Gallbladder is unremarkable. PANCREAS: Fatty atrophic changes are seen in the pancreas. SPLEEN: Unremarkable. ADRENALS: Unremarkable. KIDNEYS: Unremarkable. LYMPH NODES: No enlarged lymph nodes by size criteria. GASTROINTESTINAL TRACT: The lack of oral contrast limits bowel evaluation. No abnormal bowel dilation or wall thickening. Appendix is within normal limits. PELVIC ORGANS: Unremarkable. No free fluid or fluid collection is seen. VASCULATURE: Atherosclerotic calcifications are present. OSSEOUS: Degenerative changes are seen in the spine, particularly involving the lower lumbar facet joints. OTHER: None. _______________ IMPRESSION: 1. No acute solid organ or bowel abnormality. 2. Hepatic steatosis. Xr Chest [facility] Result Date: 6/20/2019 EXAM: Portable frontal view of the chest. CLINICAL INDICATION/HISTORY: Dizziness, hypertension, heart failure COMPARISON: None. _______________ FINDINGS: Previous median sternotomy with mediastinal clips. Normal mediastinal and cardiac silhouettes. Lungs are clear with no pleural effusion. _______________ IMPRESSION: 1. No evidence of active cardiopulmonary disease. Xr Chest [facility] Result Date: 6/20/2019 EXAM: One-view chest CLINICAL HISTORY: Respiratory distress , COMPARISON: None FINDINGS: Frontal view of the chest demonstrate low lung volumes. ET tube tip approximately 6 3 cm from the [place].  Enteric tube tip in the stomach. Right approach central line tip in the lower SVC near the atrial caval junction. . Cardiac silhouette is normal in size and contour. No acute bony or soft tissue abnormality. IMPRESSION: Line/support tubes in place as described. Low lung volumes. No acute pulmonary process identified. ASSESSMENT/IMPRESSION: 
The clinical presentation is consistent with brain death, given the neurologic examination. I would recommend withdrawal of care. RECOMMENDATIONS: 
1. OK to obtain head CT and/or EEG depending on family's wishes 2. Recommend w/d of care. Brain death form signed.  
 
Please do not hesitate to return with any questions. 
 
------------------------------------ 
Aric Calle MD 
6/21/2019 
8:24 AM

## 2019-06-21 NOTE — PROGRESS NOTES
[facility] Pulmonary, Critical Care, and Sleep Medicine Pulm/CC Note Name: Kamryn Diego MRN: [id number] : 1955 Hospital: [facility] [place] Date: 2019  Room: 110/ Subjective: This patient has been seen and evaluated at the request of Dr. Bobby Steen for patient on vent support. Patient is a 58 y.o. male with hx of CAD, CM, EF 35%, mild chronic thrombocytopenia, on chronic coumadin therapy with cardiology follow up by Dr Adwoa Anton. Patient came to ER 6/ am, with dizziness, SOB, CPs. He was c/o dark colored vomiting. He was sent to CT abd study, and patient came back to ER with worsening SOB, and was intubated. He is also hypotensive on pressor. He has recieved fluids. He is planned for PRBC and Kcentra. EKG shows ischemic changes. Dr Tianna Eller from Cardiology and Dr Jes Gracia from GI have already been consulted. No hx of chronic liver disease. 2019 Patient in icu intubated on pressors Desats overnight Brief code this am for 3 mins - not sure if real code since nurse reports waveform on arterial line Trops >200; BG elevated 400s, 500s this am 
Pupils dilated and fixed Severe shock state on lots of pressors No family at bedside.  cc overnight Blood cxs 1 of 2 positive for GPC clusters Drips Dopamine 20 mcg/kg/min Epinephrine 10 mcg/min Levophed 16 mcg/min Neosynephrine 200 mcg/min Bicarb drip 100/hr Past Medical History:  
Diagnosis Date  CAD (coronary artery disease)  Chronic systolic CHF (congestive heart failure) ([address]  CVA [facility]cerebral vascular [facility]   Diabetes ([facility] [address] [facility]  Hyperlipidemia  Hypertension  Thrombocytopenia ([facility] [address] Past Surgical History:  
Procedure Laterality Date  HX CORONARY ARTERY BYPASS GRAFT  1994  
 x 2 Prior to Admission medications Not on File No Known Allergies Social History Tobacco Use  Smoking status: Former Smoker  Smokeless tobacco: Never Used Substance Use Topics  Alcohol use: Not Currently History reviewed. No pertinent family history. Current Facility-Administered Medications Medication Dose Route Frequency  insulin lispro (HUMALOG) injection   SubCUTAneous Q6H  
 calcium gluconate 4 g in 0.9% sodium chloride 100 mL IVPB  4 g IntraVENous ONCE  
 levoFLOXacin (LEVAQUIN) 750 mg in D5W IVPB  750 mg IntraVENous Q24H  Vancomycin- Pharmacy to dose  1 Each Other Rx Dosing/Monitoring  vancomycin (VANCOCIN) 1,000 mg in 0.9% sodium chloride 250 mL IVPB  1,000 mg IntraVENous Q12H  
 NOREPINephrine (LEVOPHED) 8 mg in dextrose 5% 250 mL infusion  2-16 mcg/min IntraVENous TITRATE  chlorhexidine (PERIDEX) 0.12 % mouthwash 10 mL  10 mL Oral Q12H  
 sodium bicarbonate (8.4%) 150 mEq in sterile water 1,000 mL infusion   IntraVENous CONTINUOUS  
 PHENYLephrine (BESS-SYNEPHRINE) 30 mg in 0.9% sodium chloride 250 mL infusion   mcg/min IntraVENous TITRATE  pantoprazole (PROTONIX) 40 mg in sodium chloride 0.9% 10 mL injection  40 mg IntraVENous Q12H  
 DOPamine (INTROPIN) 800 mg in dextrose 5% 500 mL infusion  5-20 mcg/kg/min IntraVENous TITRATE  hydrocortisone Sod Succ (PF) (SOLU-CORTEF) injection 50 mg  50 mg IntraVENous Q6H  
 meropenem (MERREM) 1 g in sterile water (preservative free) 20 mL IV syringe  1 g IntraVENous Q8H  
 albumin human 25% (BUMINATE) solution 12.5 g  12.5 g IntraVENous Q6H  
 EPINEPHrine (ADRENALIN) 8 mg in 0.9% sodium chloride 250 mL infusion  1-10 mcg/min IntraVENous TITRATE Review of Systems: 
Limited due to patient condition Objective:  
Vital Signs:   
Visit Vitals /53 Pulse 100 Temp 97.5 °F (36.4 °C) Resp 24 Ht 5' 6\" (1.676 m) Wt 88.5 kg (195 lb) SpO2 93% BMI 31.47 kg/m² O2 Device: Endotracheal tube, Ventilator Temp (24hrs), Av.8 °F (36 °C), Min:92.7 °F (33.7 °C), Max:100.6 °F (38.1 °C) Intake/Output: Last shift:      No intake/output data recorded. Last 3 shifts: 06/19 1901 - 06/21 0700 In: 2714.8 [I.V.:1574.8] Out: 900 [Urine:900] Intake/Output Summary (Last 24 hours) at 6/21/2019 7936 Last data filed at 6/21/2019 0400 Gross per 24 hour Intake 2564.8 ml Output 900 ml Net 1664.8 ml ABG:   
Lab Results Component Value Date/Time PHI 7.103 (LL) 06/21/2019 05:24 AM  
 PCO2I 32.8 (L) 06/21/2019 05:24 AM  
 PO2I 98 06/21/2019 05:24 AM  
 HCO3I 10.3 (L) 06/21/2019 05:24 AM  
 FIO2I 100 06/21/2019 05:24 AM  
 
Ventilator Mode: Assist control Respiratory Rate Back-Up Rate: 24 Insp Time (sec): 1.2 sec Ventilator Volumes Vt Set (ml): 450 ml Vt Exhaled (Machine Breath) (ml): 471 ml Ve Observed (l/min): 11.3 l/min Ventilator Pressures PIP Observed (cm H2O): 24 cm H2O Plateau Pressure (cm H2O): 22 cm H2O 
MAP (cm H2O): 14 PEEP/VENT (cm H2O): 5 cm H20 Physical Exam:  
Comfortable; not sedated; not responsive; on vent support; Fio2 at 100%; cyanotic extremities HEENT: pupils dilated, fixed; no scleral jaundice, oral tubes; black colored fluid in OGT drainage Neck: No adenopathy or thyroid swelling CVS: S1S2 no murmurs; JVD not elevated RS: Mod air entry bilaterally, decreased BS at bases, no wheezes or crackles Abd: soft, non tender, no hepatosplenomegaly, no abd distension, no guarding or rigidity, bowel sounds heard Neuro: intubated, limited exam; not sedated; not responsive; breathing at set RR of 24; pupils dilated and fixed; no cough or gag reflex - noted patient on pressors and severely acidotic Extrm: no leg edema or swelling or clubbing; both feet shows purplish toes and soles of feet Skin: no rash Lymphatic: no cervical or supraclavicular adenopathy RT SV central line RT femoral central and arterial lines - no bleeding or hematoma Telemetry: mild sinus tach Data review:  
 
Recent Results (from the past 24 hour(s)) CARDIAC PANEL,(CK, CKMB & TROPONIN) Collection Time: 06/20/19  8:35 AM  
Result Value Ref Range  39 - 308 U/L  
 CK - MB 7.7 (H) <3.6 ng/ml CK-MB Index 6.9 (H) 0.0 - 4.0 % Troponin-I, QT 0.75 (H) 0.0 - 0.045 NG/ML  
CBC WITH AUTOMATED DIFF Collection Time: 06/20/19  8:36 AM  
Result Value Ref Range WBC 21.5 (H) 4.6 - 13.2 K/uL  
 RBC 2.36 (L) 4.70 - 5.50 M/uL HGB 6.4 (L) 13.0 - 16.0 g/dL HCT 20.6 (L) 36.0 - 48.0 % MCV 87.3 74.0 - 97.0 FL  
 MCH 27.1 24.0 - 34.0 PG  
 MCHC 31.1 31.0 - 37.0 g/dL  
 RDW 16.7 (H) 11.6 - 14.5 % PLATELET 300 917 - 080 K/uL MPV 11.9 (H) 9.2 - 11.8 FL  
 NEUTROPHILS 78 (H) 40 - 73 % LYMPHOCYTES 18 (L) 21 - 52 % MONOCYTES 4 3 - 10 % EOSINOPHILS 0 0 - 5 % BASOPHILS 0 0 - 2 %  
 ABS. NEUTROPHILS 16.9 (H) 1.8 - 8.0 K/UL  
 ABS. LYMPHOCYTES 3.8 (H) 0.9 - 3.6 K/UL  
 ABS. MONOCYTES 0.8 0.05 - 1.2 K/UL  
 ABS. EOSINOPHILS 0.0 0.0 - 0.4 K/UL  
 ABS. BASOPHILS 0.0 0.0 - 0.1 K/UL  
 DF AUTOMATED    
POC G3 Collection Time: 06/20/19  9:08 AM  
Result Value Ref Range Device: VENT    
 FIO2 (POC) 40 % pH (POC) 6.953 (LL) 7.35 - 7.45    
 pCO2 (POC) 28.7 (L) 35.0 - 45.0 MMHG  
 pO2 (POC) 118 (H) 80 - 100 MMHG  
 HCO3 (POC) 6.3 (L) 22 - 26 MMOL/L  
 sO2 (POC) 95 92 - 97 % Base deficit (POC) 26 mmol/L Mode ASSIST CONTROL Tidal volume 450 ml Set Rate 14 bpm  
 PEEP/CPAP (POC) 5 cmH2O Allens test (POC) YES Inspiratory Time 1.5 sec Total resp. rate 18 Site LEFT BRACHIAL Specimen type (POC) ARTERIAL Performed by Mahsa Hickey Volume control YES    
ECHO ADULT COMPLETE Collection Time: 06/20/19 10:14 AM  
Result Value Ref Range LA Volume 45.53 18 - 58 mL Right Atrial Area 4C 11.95 cm2 Ao Root D 2.99 cm  
 AO ASC D 2.30 cm Aortic Valve Systolic Peak Velocity 826.58 cm/s AoV VTI 18.12 cm Aortic Valve Area by Continuity of Peak Velocity 1.8 cm2 Aortic Valve Area by Continuity of VTI 1.6 cm2 AoV PG 5.0 mmHg LVIDd 4.43 4.2 - 5.9 cm  
 LVPWd 1.07 (A) 0.6 - 1.0 cm LVIDs 3.62 cm IVSd 1.33 (A) 0.6 - 1.0 cm  
 LV ED Vol A2C 110.6 mL  
 LV ES Vol A4C 45.9 mL  
 LV ES Vol BP 52.7 22 - 58 mL  
 LVOT d 1.94 cm  
 LVOT Peak Velocity 68.72 cm/s LVOT Peak Gradient 1.9 mmHg LVOT VTI 9.84 cm  
 LV E' Septal Velocity 4.00 cm/s LV E' Lateral Velocity 5.00 cm/s  
 MVA (PHT) 3.2 cm2  
 MV A Hugh 66.53 cm/s  
 MV E Hugh 39.31 cm/s  
 MV E/A 0.59   
 RVIDd 3.19 cm Aortic Valve Systolic Mean Gradient 2.9 mmHg BP EF 43.8 (A) 55 - 100 % LV Ejection Fraction MOD 4C 43 % LV Ejection Fraction MOD 2C 44 % LA Vol 4C 50.53 18 - 58 mL  
 LA Vol 2C 42.34 18 - 58 mL  
 LV Mass .6 (A) 88 - 224 g LV Mass AL Index 115.0 49 - 115 g/m2 E/E' lateral 7.86   
 E/E' septal 9.83   
 TAPSV 7.0 cm/s  
 E/E' ratio (averaged) 8.84 LV ES Vol A2C 61.8 mL  
 LVES Vol Index BP 26.6 mL/m2 LV ED Vol A4C 80.7 mL  
 LVED Vol Index BP 47.4 mL/m2 Mitral Valve E Wave Deceleration Time 240.8 ms Mitral Valve Pressure Half-time 69.8 ms Left Atrium Major Axis 4.73 cm Triscuspid Valve Regurgitation Peak Gradient 11.6 mmHg LV ED Vol BP 93.8 67 - 155 ml  
 TR Max Velocity 170.66 cm/s  
 LA Vol Index 23.01 16 - 28 ml/m2 LA Vol Index 21.40 16 - 28 ml/m2 LA Vol Index 25.54 16 - 28 ml/m2 LVED Vol Index A4C 40.8 mL/m2 LVED Vol Index A2C 55.9 mL/m2 LVES Vol Index A4C 23.2 mL/m2 LVES Vol Index A2C 31.2 mL/m2 JOANNE/BSA Pk Hugh 1.0 cm2/m2 JOANNE/BSA VTI 0.9 cm2/m2 IVC proximal 1.79 cm PASP 30.0 mmHg POC G3 Collection Time: 06/20/19 11:23 AM  
Result Value Ref Range Device: VENT    
 FIO2 (POC) 100 % pH (POC) 6.907 (LL) 7.35 - 7.45    
 pCO2 (POC) 41.8 35.0 - 45.0 MMHG  
 pO2 (POC) 100 80 - 100 MMHG  
 HCO3 (POC) 8.3 (L) 22 - 26 MMOL/L  
 sO2 (POC) 91 (L) 92 - 97 % Base deficit (POC) 24 mmol/L Mode ASSIST CONTROL Tidal volume 450 ml  Set Rate 20 bpm  
 PEEP/CPAP (POC) 5 cmH2O  
 Allens test (POC) N/A Site DRAWN FROM ARTERIAL LINE Specimen type (POC) ARTERIAL Performed by Jt Goff LACTIC ACID Collection Time: 06/20/19 11:38 AM  
Result Value Ref Range Lactic acid 17.9 (HH) 0.4 - 2.0 MMOL/L  
HGB & HCT Collection Time: 06/20/19 11:38 AM  
Result Value Ref Range HGB 9.8 (L) 13.0 - 16.0 g/dL HCT 30.6 (L) 36.0 - 48.0 % CARDIAC PANEL,(CK, CKMB & TROPONIN) Collection Time: 06/20/19 11:38 AM  
Result Value Ref Range  (H) 39 - 308 U/L  
 CK - MB 30.8 (H) <3.6 ng/ml CK-MB Index 7.1 (H) 0.0 - 4.0 % Troponin-I, QT 11.10 (HH) 0.0 - 0.045 NG/ML  
PROTHROMBIN TIME + INR Collection Time: 06/20/19 11:38 AM  
Result Value Ref Range Prothrombin time 20.0 (H) 11.5 - 15.2 sec INR 1.7 (H) 0.8 - 1.2 PTT Collection Time: 06/20/19 11:38 AM  
Result Value Ref Range aPTT 58.5 (H) 23.0 - 36.4 SEC  
CBC WITH AUTOMATED DIFF Collection Time: 06/20/19 11:38 AM  
Result Value Ref Range WBC 20.3 (H) 4.6 - 13.2 K/uL  
 RBC 3.36 (L) 4.70 - 5.50 M/uL HGB 9.9 (L) 13.0 - 16.0 g/dL HCT 30.7 (L) 36.0 - 48.0 % MCV 91.4 74.0 - 97.0 FL  
 MCH 29.5 24.0 - 34.0 PG  
 MCHC 32.2 31.0 - 37.0 g/dL  
 RDW 16.2 (H) 11.6 - 14.5 % PLATELET 64 (L) 426 - 420 K/uL MPV 12.3 (H) 9.2 - 11.8 FL  
 NEUTROPHILS 75 42 - 75 % LYMPHOCYTES 16 (L) 20 - 51 % MONOCYTES 4 2 - 9 % EOSINOPHILS 0 0 - 5 % BASOPHILS 0 0 - 3 % METAMYELOCYTES 1 (H) 0 % MYELOCYTES 4 (H) 0 % NRBC 3.0 (H) 0  WBC  
 ABS. NEUTROPHILS 15.2 (H) 1.8 - 8.0 K/UL  
 ABS. LYMPHOCYTES 3.2 0.8 - 3.5 K/UL  
 ABS. MONOCYTES 0.8 0 - 1.0 K/UL  
 ABS. EOSINOPHILS 0.0 0.0 - 0.4 K/UL  
 ABS. BASOPHILS 0.0 0.0 - 0.1 K/UL PLATELET COMMENTS LARGE PLATELETS    
 RBC COMMENTS ANISOCYTOSIS 2+ 
    
 RBC COMMENTS POLYCHROMASIA 1+ 
    
 RBC COMMENTS MAYTE CELLS 1+ RBC COMMENTS OVALOCYTES 
OCCASIONAL 
    
 DF MANUAL METABOLIC PANEL, COMPREHENSIVE  
 Collection Time: 06/20/19 11:38 AM  
Result Value Ref Range Sodium 146 (H) 136 - 145 mmol/L Potassium 4.2 3.5 - 5.5 mmol/L Chloride 107 100 - 108 mmol/L  
 CO2 15 (L) 21 - 32 mmol/L Anion gap 24 (H) 3.0 - 18 mmol/L Glucose 289 (H) 74 - 99 mg/dL BUN 43 (H) 7.0 - 18 MG/DL Creatinine 1.68 (H) 0.6 - 1.3 MG/DL  
 BUN/Creatinine ratio 26 (H) 12 - 20 GFR est AA 50 (L) >60 ml/min/1.73m2 GFR est non-AA 41 (L) >60 ml/min/1.73m2 Calcium 6.0 (L) 8.5 - 10.1 MG/DL Bilirubin, total 0.4 0.2 - 1.0 MG/DL  
 ALT (SGPT) 997 (H) 16 - 61 U/L  
 AST (SGOT) 1,178 (H) 15 - 37 U/L Alk. phosphatase 37 (L) 45 - 117 U/L Protein, total 3.5 (L) 6.4 - 8.2 g/dL Albumin 1.8 (L) 3.4 - 5.0 g/dL Globulin 1.7 (L) 2.0 - 4.0 g/dL A-G Ratio 1.1 0.8 - 1.7 MAGNESIUM Collection Time: 06/20/19 11:38 AM  
Result Value Ref Range Magnesium 2.4 1.6 - 2.6 mg/dL PHOSPHORUS Collection Time: 06/20/19 11:38 AM  
Result Value Ref Range Phosphorus 9.3 (H) 2.5 - 4.9 MG/DL  
ECHO ADULT FOLLOW-UP OR LIMITED Collection Time: 06/20/19 12:27 PM  
Result Value Ref Range LVIDd 4.21 4.2 - 5.9 cm  
 LVPWd 1.31 (A) 0.6 - 1.0 cm LVIDs 3.49 cm IVSd 1.23 (A) 0.6 - 1.0 cm  
 LV ED Vol A2C 86.2 mL  
 LV ES Vol A4C 63.3 mL  
 LV ES Vol BP 63.0 (A) 22 - 58 mL  
 BP EF 26.8 (A) 55 - 100 % LV Ejection Fraction MOD 4C 25 % LV Ejection Fraction MOD 2C 26 % LV Mass .1 (A) 88 - 224 g LV Mass AL Index 115.8 49 - 115 g/m2 LV ES Vol A2C 64.1 mL  
 LVES Vol Index BP 31.8 mL/m2 LV ED Vol A4C 84.0 mL  
 LVED Vol Index BP 43.5 mL/m2 LV ED Vol BP 86.1 67 - 155 ml  
 LVED Vol Index A4C 42.5 mL/m2 LVED Vol Index A2C 43.6 mL/m2 LVES Vol Index A4C 32.0 mL/m2 LVES Vol Index A2C 32.4 mL/m2 CALCIUM, IONIZED Collection Time: 06/20/19  2:18 PM  
Result Value Ref Range Ionized Calcium 0.81 (L) 1.12 - 1.32 MMOL/L  
POC G3 Collection Time: 06/20/19  4:48 PM  
Result Value Ref Range Device: VENT    
 FIO2 (POC) 100 % pH (POC) 7.028 (LL) 7.35 - 7.45    
 pCO2 (POC) 38.0 35.0 - 45.0 MMHG  
 pO2 (POC) 69 (L) 80 - 100 MMHG  
 HCO3 (POC) 10.3 (L) 22 - 26 MMOL/L  
 sO2 (POC) 86 (L) 92 - 97 % Base deficit (POC) 21 mmol/L Mode ASSIST CONTROL Allens test (POC) N/A Site DRAWN FROM ARTERIAL LINE Patient temp. 95.7 Specimen type (POC) ARTERIAL Performed by TOSIN Tee Collection Time: 06/20/19  5:15 PM  
Result Value Ref Range Unit number [id number] Blood component type FP 24h,Thaw2 Unit division 00 Status of unit ISSUED Unit number [id number] Blood component type FP 24h,Thaw2 Unit division 00 Status of unit TRANSFUSED   
GLUCOSE, POC Collection Time: 06/20/19  6:08 PM  
Result Value Ref Range Glucose (POC) 360 (H) 70 - 110 mg/dL CARDIAC PANEL,(CK, CKMB & TROPONIN) Collection Time: 06/20/19 11:15 PM  
Result Value Ref Range CK 4,106 (H) 39 - 308 U/L  
 CK - .7 (H) <3.6 ng/ml CK-MB Index 10.8 (H) 0.0 - 4.0 % Troponin-I, QT >200.00 (HH) 0.0 - 0.045 NG/ML  
LACTIC ACID Collection Time: 06/20/19 11:15 PM  
Result Value Ref Range Lactic acid 15.3 (HH) 0.4 - 2.0 MMOL/L  
HGB & HCT Collection Time: 06/20/19 11:15 PM  
Result Value Ref Range HGB 11.4 (L) 13.0 - 16.0 g/dL HCT 35.0 (L) 36.0 - 48.0 % POC G3 Collection Time: 06/20/19 11:19 PM  
Result Value Ref Range Device: VENT    
 FIO2 (POC) 100 % pH (POC) 7.118 (LL) 7.35 - 7.45    
 pCO2 (POC) 38.6 35.0 - 45.0 MMHG  
 pO2 (POC) 179 (H) 80 - 100 MMHG  
 HCO3 (POC) 12.3 (L) 22 - 26 MMOL/L  
 sO2 (POC) 99 (H) 92 - 97 % Base deficit (POC) 17 mmol/L Mode ASSIST CONTROL Tidal volume 450 ml Set Rate 24 bpm  
 PEEP/CPAP (POC) 8 cmH2O Allens test (POC) NO Inspiratory Time 1.1 sec Total resp. rate 24 Site DRAWN FROM ARTERIAL LINE Patient temp. 38.0  Specimen type (POC) ARTERIAL    
 Performed by Alda Gonsalves Volume control plus YES    
GLUCOSE, POC Collection Time: 06/21/19  1:03 AM  
Result Value Ref Range Glucose (POC) 414 (HH) 70 - 110 mg/dL GLUCOSE, POC Collection Time: 06/21/19  3:00 AM  
Result Value Ref Range Glucose (POC) 423 (HH) 70 - 110 mg/dL CALCIUM, IONIZED Collection Time: 06/21/19  4:31 AM  
Result Value Ref Range Ionized Calcium 0.85 (L) 1.12 - 1.32 MMOL/L  
HEPATIC FUNCTION PANEL Collection Time: 06/21/19  4:31 AM  
Result Value Ref Range Protein, total 4.8 (L) 6.4 - 8.2 g/dL Albumin 2.7 (L) 3.4 - 5.0 g/dL Globulin 2.1 2.0 - 4.0 g/dL A-G Ratio 1.3 0.8 - 1.7 Bilirubin, total 0.8 0.2 - 1.0 MG/DL Bilirubin, direct 0.3 (H) 0.0 - 0.2 MG/DL Alk. phosphatase 65 45 - 117 U/L  
 AST (SGOT) 9,638 (H) 15 - 37 U/L  
 ALT (SGPT) 4,841 (H) 16 - 61 U/L  
METABOLIC PANEL, BASIC Collection Time: 06/21/19  4:31 AM  
Result Value Ref Range Sodium 139 136 - 145 mmol/L Potassium 4.8 3.5 - 5.5 mmol/L Chloride 99 (L) 100 - 108 mmol/L  
 CO2 14 (L) 21 - 32 mmol/L Anion gap 26 (H) 3.0 - 18 mmol/L Glucose 426 (HH) 74 - 99 mg/dL BUN 55 (H) 7.0 - 18 MG/DL Creatinine 3.78 (H) 0.6 - 1.3 MG/DL  
 BUN/Creatinine ratio 15 12 - 20 GFR est AA 20 (L) >60 ml/min/1.73m2 GFR est non-AA 16 (L) >60 ml/min/1.73m2 Calcium 6.7 (L) 8.5 - 10.1 MG/DL  
CBC WITH AUTOMATED DIFF Collection Time: 06/21/19  4:31 AM  
Result Value Ref Range WBC 21.6 (H) 4.6 - 13.2 K/uL  
 RBC 3.50 (L) 4.70 - 5.50 M/uL  
 HGB 10.3 (L) 13.0 - 16.0 g/dL HCT 31.9 (L) 36.0 - 48.0 % MCV 91.1 74.0 - 97.0 FL  
 MCH 29.4 24.0 - 34.0 PG  
 MCHC 32.3 31.0 - 37.0 g/dL  
 RDW 16.3 (H) 11.6 - 14.5 % PLATELET 72 (L) 928 - 420 K/uL MPV 13.0 (H) 9.2 - 11.8 FL  
 NEUTROPHILS 78 (H) 42 - 75 % BAND NEUTROPHILS 13 (H) 0 - 5 % LYMPHOCYTES 6 (L) 20 - 51 % MONOCYTES 3 2 - 9 % EOSINOPHILS 0 0 - 5 % BASOPHILS 0 0 - 3 % ABS. NEUTROPHILS 16.8 (H) 1.8 - 8.0 K/UL  
 ABS. LYMPHOCYTES 1.3 0.8 - 3.5 K/UL  
 ABS. MONOCYTES 0.6 0 - 1.0 K/UL  
 ABS. EOSINOPHILS 0.0 0.0 - 0.4 K/UL  
 ABS. BASOPHILS 0.0 0.0 - 0.1 K/UL PLATELET COMMENTS 1+ LARGE PLATELETS   
 RBC COMMENTS ANISOCYTOSIS 1+ 
    
 RBC COMMENTS POLYCHROMASIA 2+ 
    
 DF MANUAL    
LACTIC ACID Collection Time: 06/21/19  4:31 AM  
Result Value Ref Range Lactic acid 17.0 (HH) 0.4 - 2.0 MMOL/L  
PROTHROMBIN TIME + INR Collection Time: 06/21/19  4:31 AM  
Result Value Ref Range Prothrombin time 29.2 (H) 11.5 - 15.2 sec INR 2.7 (H) 0.8 - 1.2 PTT Collection Time: 06/21/19  4:31 AM  
Result Value Ref Range aPTT 49.6 (H) 23.0 - 36.4 SEC HEMOGLOBIN A1C WITH EAG Collection Time: 06/21/19  4:31 AM  
Result Value Ref Range Hemoglobin A1c 6.1 (H) 4.2 - 5.6 % Est. average glucose 128 mg/dL MAGNESIUM Collection Time: 06/21/19  4:31 AM  
Result Value Ref Range Magnesium 2.4 1.6 - 2.6 mg/dL PHOSPHORUS Collection Time: 06/21/19  4:31 AM  
Result Value Ref Range Phosphorus 9.0 (H) 2.5 - 4.9 MG/DL  
POC G3 Collection Time: 06/21/19  5:24 AM  
Result Value Ref Range Device: VENT    
 FIO2 (POC) 100 % pH (POC) 7.103 (LL) 7.35 - 7.45    
 pCO2 (POC) 32.8 (L) 35.0 - 45.0 MMHG  
 pO2 (POC) 98 80 - 100 MMHG  
 HCO3 (POC) 10.3 (L) 22 - 26 MMOL/L  
 sO2 (POC) 95 92 - 97 % Base deficit (POC) 19 mmol/L Mode ASSIST CONTROL Tidal volume 450 ml Set Rate 24 bpm  
 PEEP/CPAP (POC) 5 cmH2O Allens test (POC) NO Inspiratory Time 1.2 sec Total resp. rate 24 Site DRAWN FROM ARTERIAL LINE Patient temp. 36.6 Specimen type (POC) ARTERIAL Performed by Zachary Hicks Volume control plus YES    
GLUCOSE, POC Collection Time: 06/21/19  6:30 AM  
Result Value Ref Range Glucose (POC) 503 () 70 - 110 mg/dL Recent Labs  
  06/21/19 
0524 06/20/19 
2319 06/20/19 
1648 FIO2I 100 100 100 HCO3I 10.3* 12.3* 10.3* PCO2I 32.8* 38.6 38.0 PHI 7.103* 7.118* 7.028* PO2I 98 179* 69* All Micro Results Procedure Component Value Units Date/Time CULTURE, BLOOD [[id number]] Collected:  06/20/19 0525 Order Status:  Completed Specimen:  Blood Updated:  06/21/19 0741 Special Requests: NO SPECIAL REQUESTS Culture result: NO GROWTH 1 DAY     
 CULTURE, BLOOD [[id number]] Collected:  06/20/19 0457 Order Status:  Completed Specimen:  Whole Blood Updated:  06/21/19 5395 Special Requests: NO SPECIAL REQUESTS     
  GRAM STAIN    
  AEROBIC BOTTLE GRAM POSITIVE COCCI IN CLUSTERS  
     
      
  SMEAR CALLED TO AND CORRECTLY REPEATED BY: Cristian Avilez RN, ICU ON 06/21/2019 AT 6471 TO JDA Culture result:    
  CULTURE IN PROGRESS,FURTHER UPDATES TO FOLLOW Sent to [facility] for ID/Susceptibility if indicated ECHO Echo Cardiogram Complete9/24/2018 [employer] Component Name Value Ref Range EF Echo 35    
Result Impression  
: 
 MILD LEFT VENTRICULAR ENLARGEMENT WITH NORMAL WALL THICKNESS. REDUCED LEFT VENTRICULAR SYSTOLIC FUNCTION, VISUAL EJECTION FRACTION 35%. SEVERE APICAL, MID TO APICAL ANTERIOR AND ANTEROSEPTAL  HYPOKINESIS. MILD APICAL INFERIOR HYPOKINESIS. STAGE I DIASTOLIC DYSFUNCTION. NORMAL RIGHT VENTRICULAR SIZE AND FUNCTION, VISUALLY. MILD MITRAL REGURGITATION. TRACE TRICUSPID REGURGITATION. MILD AORTIC VALVE SCLEROSIS WITHOUT STENOSIS. PEAK TRICUSPID VELOCITY IS 2.1 M/S SUGGESTING  NORMAL PULMONARY ARTERY PRESSURE. NO PREVIOUS REPORT FOR COMPARISON. Echo 6/21/2019 Interpretation Summary · Left Ventricle: Mildly increased wall thickness. Outflow tract obstruction. Moderate systolic dysfunction. Estimated left ventricular ejection fraction is 36 - 40%. Mild (grade 1) left ventricular diastolic dysfunction E/E' ratio is 8.84. · Right Ventricle: Reduced systolic function. · Aortic Valve: Aortic valve leaflet calcification present without reduced excursion. · Mitral Valve: Mitral valve non-specific thickening. Mild mitral annular calcification. Trace mitral valve stenosis. · Tricuspid Valve: Mild tricuspid valve regurgitation is present. · Pulmonary Artery: Mild pulmonary hypertension. · IVC/Hepatic Veins: Mechanically ventilated; cannot use inferior caval vein diameter to estimate central venous pressure. · Pulmonary arterial systolic pressure is 84.6 mmHg. Repeat Echo 6/21/19 Interpretation Summary · Left Ventricle: Mild concentric hypertrophy. Moderate-to-severe systolic dysfunction. Estimated left ventricular ejection fraction is 26 - 30%. Imaging: 
[x]I have personally reviewed the patients chest radiographs images and report Xray 6/21 Results from Hospital Encounter encounter on 06/20/19 XR CHEST PORT Narrative A portable AP radiograph of the chest was obtained at 0644 hours: 
History:  Intubated with respiratory failure. Comparison:  Portable chest 0757 hours previous day. FINDINGS:  
  
Lines and tubes: ET tube, NG/OG tube and right-sided PICC line again seen 
stable. There is what appears to be an esophageal monitor. Heart and mediastinum: Borderline heart size with previous median sternotomy. Lungs and pleura: Hazy changes seen throughout both perihilar regions developing 
since the prior study. Hypoinflation. Aorta: Unremarkable. Bones: Within normal limits for age. Other: None Impression IMPRESSION: 
 
Lines and tubes as described without pneumothorax. Perihilar hazy changes compatible with infiltrates or asymmetric pulmonary 
edema. Results from Hospital Encounter encounter on 06/20/19 CT ABD PELV W CONT Narrative EXAM: CT ABDOMEN AND PELVIS IV ONLY 
 
INDICATION: Abdominal pain , nausea and vomiting, dizziness COMPARISON: No prior comparison study TECHNIQUE: Axial CT imaging of the abdomen and pelvis was performed after the 
administration of only IV contrast as per specific clinician request.  
Multiplanar reformats were generated. One or more dose reduction techniques 
were used on this CT: automated exposure control, adjustment of the mAs and/or kVp according to patient's size, and iterative reconstruction techniques. The 
specific techniques utilized on this CT exam have been documented in the 
patient's electronic medical record. [employer] in 
Medicine (DICOM) format image data are available to nonaffiliated external 
healthcare facilities or entities on a secure, media free, reciprocally 
searchable basis with patient authorization for at least a 12-month period after 
this study. _______________ FINDINGS: 
 
Motion artifact is present which limits evaluation. LOWER CHEST: Mild dependent atelectasis is present in the lung bases. Partially 
visualized median sternotomy wires are present. There is no pericardial or 
pleural effusion. LIVER, BILIARY: The liver has diffusely decreased attenuation consistent with 
hepatic steatosis. No abnormal biliary dilation. Gallbladder is unremarkable. PANCREAS: Fatty atrophic changes are seen in the pancreas. SPLEEN: Unremarkable. ADRENALS: Unremarkable. KIDNEYS: Unremarkable. LYMPH NODES: No enlarged lymph nodes by size criteria. GASTROINTESTINAL TRACT: The lack of oral contrast limits bowel evaluation. No 
abnormal bowel dilation or wall thickening. Appendix is within normal limits. PELVIC ORGANS: Unremarkable. No free fluid or fluid collection is seen. VASCULATURE: Atherosclerotic calcifications are present. OSSEOUS: Degenerative changes are seen in the spine, particularly involving the 
lower lumbar facet joints. OTHER: None. 
 
_______________ Impression IMPRESSION: 
 
1. No acute solid organ or bowel abnormality. 2. Hepatic steatosis. IMPRESSION:  
 
Patient Active Problem List  
Diagnosis Code  NSTEMI (non-ST elevated myocardial infarction) ([facility] I21.4  Acute respiratory failure with hypoxemia ([facility]) J96.01  Shock, cardiogenic ([facility] R57.0  Coagulopathy ([facility] D68.9  
 Gastrointestinal hemorrhage with hematemesis K92.0  Cardiomyopathy ([facility] I42.9  CAD (coronary artery disease) I25.10  Lactic acidosis E87.2  Anemia due to GI blood loss D50.0  Metabolic acidosis F88.1  Systolic CHF, acute on chronic (HCC) I50.23  Cardiac arrest ([facility] I46.9  Anoxic encephalopathy ([facility]) G93.1  Acute renal failure ([facility]) N17.9  Shock liver K72.00 RECOMMENDATIONS:  
· Pulm: Vent management; VCV 24/450/100%/peep 5; Peep cannot be increased due to severity of shock; Vent bundles; not a candidate for weaning protocol; keep O2 sats 92% or above; keep plateau pressure <93; CXR reviewed - RT SV line, ET tube good; low lung volumes; shows worsening pulm edema findings · Severe metabolic acidosis on ABG; severe lactic acidosis; high min vent as above; bicarb drip and bicarb pushes as needed; follow ABGs every 6 hours for now; patient has fem art line · Sedation: none started · Cardiac: several pressors; stress dose hydrocortisone; EF reported 26-30%; Cardiology Dr Talya Mclean on case; Trops >200; patient is high risk candidate for cath lab due to GI bleeding state; patient would not be able to tolerate antiplatelets due to active GI bleeding per Cardiology · Hem: keep Hb > 9 gm/dl due to acute MI and severe shock state; s/p Kcentra and PRBCs; coffee ground drainage from OGT · ID: BS antibiotics; follow lactic acid which is likely driven by cardiogenic shock; 1 of 2 sets GPC clusters; on merrem and iv vanc; stopped levaquin; follow cxs · Renal: watch IOs and renal fn; worsening creatinine suggestive of acute renal failure from shock state; replace calcium · GI: PPI bid - no drip due to shortage; GI Dr Chad Moran consulted; patient appears too unstable for endoscopy per GI; shock liver state now, with elevated transaminases. · Neuro: not stable for CT head; prelim findings of brain death, but patient noted patient acidotic on several pressors; regardless, prognosis is dismal; [facility] has been contacted · Endo: start insulin drip as per discussion with RN and [facility] for maintaining -180 · Vasc: RT SV line; RT femoral central and arterial lines · Fluids: Bicarb drip 100/hr; watch for CHF- patient in cardiogenic shock and likely to develop worsening pulm edema; Albumin q6 · Nutrition:  OGT with IWS; NPO for now · Proph:  DVt and GI proph - SCDs and PPI · D/w Dr Jonas Neal · D/w RN - who called son; he has come from CT last night; he wants to rest and come later today · Palliative care consulted; D/w Dr Iglesia Reynoso about case Will defer respective systems problem management to primary and other consultant and follow patient in ICU with primary and other medical team 
Further recommendations will be based on the patient's response to recommended treatment and results of the investigation ordered. Quality Care: PPI, DVT prophylaxis, HOB elevated, Infection control all reviewed and addressed. · Lines/Tubes: PIVs; ET tube, OG tube, RT SV line, Dillon cath ADVANCE DIRECTIVE: Full Code · Prognosis is very poor; mortality is high. High complexity decision making was performed in this consultation and evaluation of this patient who is at high risk for decompensation with multiple organ involvement Total critical care time spent rendering care exclusive of procedures: 54 minutes Federico Garcia MD

## 2019-06-21 NOTE — PROGRESS NOTES
Code Blue Patient went into PEA? Improved with CPR for 3 minutes Son contacted and informed He is in town and wants everything done to keep dad alive until mom comes to visit with him Son said to do everything possible to keep him alive

## 2019-06-21 NOTE — PROGRESS NOTES
Pharmacy Dosing Services: [facility] The following medication: [facility] was automatically dose-adjusted per THE [facility] P&T Committee Protocol, with respect to renal function. Consult provided for this   58 y.o. , male , for the indication of Sepsis. Pt Weight:  
Wt Readings from Last 1 Encounters:  
06/21/19 92.5 kg (203 lb 13.4 oz) Previous Regimen Merrem 1 gm IV q8 Serum Creatinine Lab Results Component Value Date/Time Creatinine 3.78 (H) 06/21/2019 04:31 AM  
 
   
Creatinine Clearance Estimated Creatinine Clearance: 21 mL/min (A) (based on SCr of 3.78 mg/dL (H)). BUN Lab Results Component Value Date/Time BUN 55 (H) 06/21/2019 04:31 AM  
 
   
 
Dosage changed to:  Merrem 500 mg IV q12 Pharmacy to continue to monitor patient daily. Will make dosage adjustments based upon changing renal function. Elena Chang, Pharm. D. Clinical Pharmacist 
[phone]

## 2019-06-21 NOTE — PROGRESS NOTES
Walked into patient room and SPO2 was in the high 80s. Suctioned patient, SPO2 dropped into the 70s. Disconnected patient from vent and bagged on 100%. SPO2 increased into the mid 90s. Patient placed back on ventilator.

## 2019-06-21 NOTE — DIABETES MGMT
Glycemic Control Record reviewed for glycemic control. Current status and plan noted. Will continue to monitor and follow-up per policy. Moisés Daniel [address]. 91.

## 2019-06-21 NOTE — PROGRESS NOTES
Pharmacy Dosing Services: Vancomycin The following medication: Vancomycin was automatically dose-adjusted per THE [facility] P&T Committee Protocol, with respect to renal function. Consult provided for this   58 y.o. , male , for the indication of Sepsis. Pt Weight:  
Wt Readings from Last 1 Encounters:  
06/21/19 92.5 kg (203 lb 13.4 oz) Previous Regimen Vancomycin 1 gm IV q12 Serum Creatinine Lab Results Component Value Date/Time Creatinine 3.78 (H) 06/21/2019 04:31 AM  
 
   
Creatinine Clearance Estimated Creatinine Clearance: 21 mL/min (A) (based on SCr of 3.78 mg/dL (H)). BUN Lab Results Component Value Date/Time BUN 55 (H) 06/21/2019 04:31 AM  
 
   
 
Dosage changed due to drastic renal decline to:  Vancomycin 1 gm IV q36, beginning @07:00 06/22/19 (36 hrs after last dose), based upon population pharmacokinetics. Too few doses have been administered amidst a rapid renal decline for Trough levels to be useful today. Pharmacy to continue to monitor patient daily. Will make dosage adjustments based upon changing renal function. Diony Walsh, Pharm. D. Clinical Pharmacist 
[phone]

## 2019-06-21 NOTE — PROGRESS NOTES
Pulm/CC 
Mel son Latoya Thomas this am; long discussion along with palliative care team 
Patient is s/p cardiac arrest, severe MI, shock state, multi-organ failure, high vent requirements, prelim findings concerning for lack of brain activity. Dicussed inability to get CT head due to unstable medical condition. Discussed care is futile and patient is dying. Discussed ethics of doing further CPR in this situation. Recommended DNR and comfort care. Discussed that patient wife legal next of kin is not present, and raised my concerns; son assures that she is fully aware; there seems cultural differences in processing all this critical information; son will be point person of communication for the family. Son plans to bring his mom later today. He wants full code status at this time. Time spent 30 mins Cesar Bowens MD

## 2019-06-21 NOTE — PROGRESS NOTES
conducted a Follow up consultation and Spiritual Assessment for James Arevalo, who is a 58 y.o.,male. The  provided the following Interventions: 
Continued the relationship of care and support With family. Listened empathically. Offered  assurance of prayer on patient's behalf. Chart reviewed. The following outcomes were achieved: 
Family made decision to make patient a DNR, but not to withdraw support. If patient flat lines no interventions will be made. Patient expressed gratitude for pastoral care visit. Assessment: 
There are no further spiritual or Episcopal issues which require Spiritual Care Services interventions at this time. Plan: 
Chaplains will continue to follow and will provide pastoral care on an as needed/requested basis.  recommends bedside caregivers page  on duty if patient shows signs of acute spiritual or emotional distress. Rev. Dawn Varma ,Spiritual Care [employer] 
[phone]

## 2019-06-21 NOTE — CONSULTS
Clinical Ethics Consultation Note: 
 
Relevant Information: 
Called by Maria Dolores Paiz regarding this 57yo man with multiple medical problems who was admitted yesterday with GI bleed. Patient's family has been contacted, and medical team has expressed the emergent nature of decision making. Family has not yet arrived. At present, this patient is requiring the highest level of medical support and has received extensive resuscitative care. Ethical Question and or Issue(s):  
Please assist with medical decision making in this patient who has no surrogate decision makers available in the immediate time period and who is likely dying. Analysis and Discussion: Autonomy (respecting patient choices/values)- This patient's surrogate decision makers are not available, and guardianship is impracticable. Patient's wishes are unknown, and substituted judgment is not an option. Beneficence (Doing good for the patient)- Best interest standard is appropriate for the medical team at this time. It is important that the medical team bring resources to this patient and achieve reasonable agreement about his care if no surrogate decision maker can be reached. A best interest standard includes attending to patient comfort. Nonmaleficence (Avoiding harming the patient)- All medical treatments and interventions pose risk of harm. Justice (ensuring equal treatment for all patients)- This patient is vulnerable, as his wishes are not known, and no surrogate is available. The medical team is giving these weighty matters proper consideration. The wife is non-English speaking, and the medical team is certain that the family understands that they are needed for medical decision making. Professional standards (upholding guidance of the healing professions)- The medical team should apply standards of care to the best interest of this patient. Recommendations and Guidance: 1. Continue efforts to include appropriate surrogate decision makers. 2. In urgent situations, such as this one, it is permissible to apply a best interest standard, including attending to patient comfort when medical professionals agree that the patient is dying and escalations in treatments and technology will not likely save him. Closing:  
For questions or concerns regarding this consultation, please call me directly. Florencio Bella MD, MPH, [address] [place] [address], [facility] 
[phone] End of life case

## 2019-06-21 NOTE — PROGRESS NOTES
0130 - [facility] Contacted. Spoke with [employer]. Organ coordinator not available at this time, Chata Ferguson. Coordinator to call back within the hour. GCS 3 
0155 - Osman Carrion called back, updated with pt information and answered all questions at this time. [facility] rep will be on site in AM. 9801 - In to assess pt, VTACH on monitor, No pulse palpapated or doppered. CODE START, COMPRESSIONS START, MD AT BEDSIDE 
0600 - PULSE REGAINED ROSC, dopplered and palpated. No changes to drips at this time. No meds given.

## 2019-06-21 NOTE — PROGRESS NOTES
Pulm/CC Family meeting done with patient wife and son Hazel Morales; RN Shirley Brownlee was present. Discussed patient condition, critical state, s/p cardiac arrest, multi-organ failure, lots of pressors; patient progressively becoming bradycardic. Discussed that patient is dying and the process seems irreversible. Also, examination seems to indicate brain death. EEG shows severe depression of brain waves. Recommended DNR. After discussion, family and son agreeable for DNR and feel better to have made this joint decision between themselves. It appears they are not keen for withdrawal of care however, hence will plan to continue current level of care without further escalation. I have answered all questions to best of my ability. DNR status now. Time spent 20 mins Merlyn Mendieta MD

## 2019-06-21 NOTE — PROGRESS NOTES
1910: Bedside and Verbal shift change report given to Marielena Weinstein, RN & Rola Berg, RN (oncoming nurse) by Twanna Goldmann, RICK (offgoing nurse). Report included the following information SBAR, Kardex, Intake/Output, MAR, Recent Results and Cardiac Rhythm ST.  
2000: Assessment completed. The patient is unresponsive and does not react to any stimuli. Pupils are fixed and dilated. Lungs are course bilaterally, bowel sounds active, whyte in place. The urine is clear with sediment. The feet are cyanotic, but pulses were present. Will continue to monitor. 0000: Reassessment completed. The patient has had no new changes. 0130: [facility] was contacted. Representative Carley Christianson stated that Abdirashid Johnson would be contacting the unit about the patient. 0155: Abdirashid Alex from [employer] called. She was given the patient's updated information. She stated someone would be onsite in the morning. 0400: Patient reassessed. Patient's condition remains the same. 7532: CODE CALLED. No pulse detected by palpation or doppler. PEA. Compressions started, MD at bedside. 0600: Pulse regained. No changes to drips. 0710: Bedside and Verbal shift change report given to ALEX Weiner (oncoming nurse) by Rola Berg, RN (offgoing nurse). Report included the following information SBAR, Kardex, Intake/Output, MAR, Recent Results and Cardiac Rhythm ST.

## 2019-06-21 NOTE — PROGRESS NOTES
responded to Code for  Braydon Mathews, who is a 58 y.o.,male, The  provided the following Interventions: 
Provided crisis pastoral care and pastoral support. Offered prayers on behalf for the patient. Chart reviewed. The following outcomes were achieved: 
Patient was successfully resuscitated. Assessment: 
There are no spiritual or Islam issues which require intervention at this time. Plan: 
Chaplains will continue to follow and will provide pastoral care on an as needed/requested basis.  recommends bedside caregivers page  on duty if patient shows signs of acute spiritual or emotional distress.

## 2019-06-21 NOTE — PROGRESS NOTES
4218: Bedside shift change report received from [address] [place] [address]. Report included the following information SBAR, Kardex, Intake/Output, MAR, Recent Results, Med Rec Status and Cardiac Rhythm Sinus Tach/Sinus Rhythm wide QRS, intermittent runs vtach and plan of care. Pulses only obtained by doppler. Pts eyes dilated/fixed. Family did not come in yesterday evening as planned. Family was called at 6 am by MD, son was \"going to take a nap and come in later. \" 
0715: Shift assessment complete. See flowsheet. 0730: Dr. Tank Whitehead at bedside with [facility] RN to perform brain death testing. 0750: Testing complete. See Neurology note. 0815: Called son, Preet Lang, at request MD, to set up family meeting to discuss patient condition and plan. Update of condition given. Son states d/t situation, he is going to take try to relax and take quick nap then he will get himself and mother and come in around 8 am. Son inquired would patient still be here if he passes. 0930: Call [facility] back with time of death. Not canidate for organ donation. Call [facility] with time of death. Insulin drip discontinued. 1200: Reassessment complete. See flowsheet. 1600: Reassessment complete. See flowsheet. 1700: Present for family meeting with patients spouse, patients son, and Dr. Jitendra Chapman. See MD note. Joint decision by spouse and son to make patient DNR. Continue pressor support but no escalation of care. 1905: Bedside shift change report given to Gopi Mcdonald RN and Niels Diego. Report included the following information SBAR, Kardex, Intake/Output, MAR, Recent Results, Med Rec Status and Cardiac Rhythm multiple runs of vtach, ectopy, ventricular rhythms (MD aware) and plan of care.

## 2019-06-21 NOTE — PALLIATIVE CARE
Thank you for consult [facility] present. Poor prognosis discussed in detail by [facility]. We did not wish to make any care or goals of care changes. Son understands despite best aggressive care this is very likely an event of which his father will not  survive. We offered support to son in this most difficult time. He wants to bring his mother in order for her to say goodbye, but asked she be allowed to \" rest \" first.  
 
They have little cultural support. Have been in the [place] since 1970's. Son is aware medical recommendation understanding the very poor prognosis for survival is comfort directed approach.

## 2019-06-21 NOTE — PROGRESS NOTES
Cardiology Progress Note 6/21/2019 11:05 AM 
 
Admit Date: 6/20/2019 Admit Diagnosis: STEMI (ST elevation myocardial infarction) ([facility] [address] [I21.3] Subjective:  
 
Apple Davila has multiple organ system issues. According to neurology he is brain dead. Visit Vitals /75 Pulse (!) 102 Temp 97.2 °F (36.2 °C) Resp 24 Ht 5' 6\" (1.676 m) Wt 92.5 kg (203 lb 13.4 oz) SpO2 94% BMI 32.90 kg/m² Current Facility-Administered Medications Medication Dose Route Frequency  glucose chewable tablet 16 g  4 Tab Oral PRN  
 glucagon (GLUCAGEN) injection 1 mg  1 mg IntraMUSCular PRN  
 dextrose 10% infusion 125-250 mL  125-250 mL IntraVENous PRN  
 insulin regular (NOVOLIN R, HUMULIN R) 100 Units in 0.9% sodium chloride 100 mL infusion  0-50 Units/hr IntraVENous TITRATE  dextrose 10% infusion 100 mL  100 mL IntraVENous PRN  
 sodium chloride (NS) flush 5-10 mL  5-10 mL IntraVENous PRN  
 nitroglycerin (NITROSTAT) tablet 0.4 mg  0.4 mg SubLINGual PRN  Vancomycin- Pharmacy to dose  1 Each Other Rx Dosing/Monitoring  vancomycin (VANCOCIN) 1,000 mg in 0.9% sodium chloride 250 mL IVPB  1,000 mg IntraVENous Q12H  
 0.9% sodium chloride infusion 250 mL  250 mL IntraVENous PRN  
 NOREPINephrine (LEVOPHED) 8 mg in dextrose 5% 250 mL infusion  2-16 mcg/min IntraVENous TITRATE  fentaNYL citrate (PF) injection 50 mcg  50 mcg IntraVENous Q4H PRN  chlorhexidine (PERIDEX) 0.12 % mouthwash 10 mL  10 mL Oral Q12H  
 sodium bicarbonate (8.4%) 150 mEq in sterile water 1,000 mL infusion   IntraVENous CONTINUOUS  
 PHENYLephrine (BESS-SYNEPHRINE) 30 mg in 0.9% sodium chloride 250 mL infusion   mcg/min IntraVENous TITRATE  
 0.9% sodium chloride infusion 250 mL  250 mL IntraVENous PRN  pantoprazole (PROTONIX) 40 mg in sodium chloride 0.9% 10 mL injection  40 mg IntraVENous Q12H  
 DOPamine (INTROPIN) 800 mg in dextrose 5% 500 mL infusion  5-20 mcg/kg/min IntraVENous TITRATE  hydrocortisone Sod Succ (PF) (SOLU-CORTEF) injection 50 mg  50 mg IntraVENous Q6H  
 meropenem (MERREM) 1 g in sterile water (preservative free) 20 mL IV syringe  1 g IntraVENous Q8H  
 0.9% sodium chloride infusion 250 mL  250 mL IntraVENous PRN  
 ELECTROLYTE REPLACEMENT PROTOCOL - Potassium Standard Dosing   1 Each Other PRN  
 ELECTROLYTE REPLACEMENT PROTOCOL - Magnesium   1 Each Other PRN  
 ELECTROLYTE REPLACEMENT PROTOCOL - Phosphorus  Standard Dosing  1 Each Other PRN  
 ELECTROLYTE REPLACEMENT PROTOCOL - Calcium   1 Each Other PRN  
 0.9% sodium chloride infusion 250 mL  250 mL IntraVENous PRN  
 albumin human 25% (BUMINATE) solution 12.5 g  12.5 g IntraVENous Q6H  
 EPINEPHrine (ADRENALIN) 8 mg in 0.9% sodium chloride 250 mL infusion  1-10 mcg/min IntraVENous TITRATE Objective:  
  
Physical Exam: 
Comfortable; not sedated; not responsive; on vent support; Fio2 at 100%; cyanotic extremities HEENT: pupils dilated, fixed; no scleral jaundice, oral tubes; black colored fluid in OGT drainage Neck: No adenopathy or thyroid swelling CVS: S1S2 no murmurs; JVD not elevated RS: Mod air entry bilaterally, decreased BS at bases, no wheezes or crackles Abd: soft, non tender, no hepatosplenomegaly, no abd distension, no guarding or rigidity, bowel sounds heard Neuro: intubated, limited exam; not sedated; not responsive; breathing at set RR of 24; pupils dilated and fixed; no cough or gag reflex - noted patient on pressors and severely acidotic Extrm: no leg edema or swelling or clubbing; both feet shows purplish toes and soles of feet Skin: no rash Lymphatic: no cervical or supraclavicular adenopathy RT SV central line RT femoral central and arterial lines - no bleeding or hematoma 
  
 
 
 
Data Review:  
Labs:   
Recent Results (from the past 24 hour(s)) POC G3 Collection Time: 06/20/19 11:23 AM  
Result Value Ref Range Device: VENT    
 FIO2 (POC) 100 % pH (POC) 6.907 (LL) 7.35 - 7.45    
 pCO2 (POC) 41.8 35.0 - 45.0 MMHG  
 pO2 (POC) 100 80 - 100 MMHG  
 HCO3 (POC) 8.3 (L) 22 - 26 MMOL/L  
 sO2 (POC) 91 (L) 92 - 97 % Base deficit (POC) 24 mmol/L Mode ASSIST CONTROL Tidal volume 450 ml Set Rate 20 bpm  
 PEEP/CPAP (POC) 5 cmH2O Allens test (POC) N/A Site DRAWN FROM ARTERIAL LINE Specimen type (POC) ARTERIAL Performed by Catrina Waters LACTIC ACID Collection Time: 06/20/19 11:38 AM  
Result Value Ref Range Lactic acid 17.9 (HH) 0.4 - 2.0 MMOL/L  
HGB & HCT Collection Time: 06/20/19 11:38 AM  
Result Value Ref Range HGB 9.8 (L) 13.0 - 16.0 g/dL HCT 30.6 (L) 36.0 - 48.0 % CARDIAC PANEL,(CK, CKMB & TROPONIN) Collection Time: 06/20/19 11:38 AM  
Result Value Ref Range  (H) 39 - 308 U/L  
 CK - MB 30.8 (H) <3.6 ng/ml CK-MB Index 7.1 (H) 0.0 - 4.0 % Troponin-I, QT 11.10 (HH) 0.0 - 0.045 NG/ML  
PROTHROMBIN TIME + INR Collection Time: 06/20/19 11:38 AM  
Result Value Ref Range Prothrombin time 20.0 (H) 11.5 - 15.2 sec INR 1.7 (H) 0.8 - 1.2 PTT Collection Time: 06/20/19 11:38 AM  
Result Value Ref Range aPTT 58.5 (H) 23.0 - 36.4 SEC  
CBC WITH AUTOMATED DIFF Collection Time: 06/20/19 11:38 AM  
Result Value Ref Range WBC 20.3 (H) 4.6 - 13.2 K/uL  
 RBC 3.36 (L) 4.70 - 5.50 M/uL HGB 9.9 (L) 13.0 - 16.0 g/dL HCT 30.7 (L) 36.0 - 48.0 % MCV 91.4 74.0 - 97.0 FL  
 MCH 29.5 24.0 - 34.0 PG  
 MCHC 32.2 31.0 - 37.0 g/dL  
 RDW 16.2 (H) 11.6 - 14.5 % PLATELET 64 (L) 873 - 420 K/uL MPV 12.3 (H) 9.2 - 11.8 FL  
 NEUTROPHILS 75 42 - 75 % LYMPHOCYTES 16 (L) 20 - 51 % MONOCYTES 4 2 - 9 % EOSINOPHILS 0 0 - 5 % BASOPHILS 0 0 - 3 % METAMYELOCYTES 1 (H) 0 % MYELOCYTES 4 (H) 0 % NRBC 3.0 (H) 0  WBC  
 ABS. NEUTROPHILS 15.2 (H) 1.8 - 8.0 K/UL  
 ABS. LYMPHOCYTES 3.2 0.8 - 3.5 K/UL  
 ABS. MONOCYTES 0.8 0 - 1.0 K/UL  
 ABS. EOSINOPHILS 0.0 0.0 - 0.4 K/UL ABS. BASOPHILS 0.0 0.0 - 0.1 K/UL PLATELET COMMENTS LARGE PLATELETS    
 RBC COMMENTS ANISOCYTOSIS 2+ 
    
 RBC COMMENTS POLYCHROMASIA 1+ 
    
 RBC COMMENTS MAYTE CELLS 1+ RBC COMMENTS OVALOCYTES 
OCCASIONAL 
    
 DF MANUAL METABOLIC PANEL, COMPREHENSIVE Collection Time: 06/20/19 11:38 AM  
Result Value Ref Range Sodium 146 (H) 136 - 145 mmol/L Potassium 4.2 3.5 - 5.5 mmol/L Chloride 107 100 - 108 mmol/L  
 CO2 15 (L) 21 - 32 mmol/L Anion gap 24 (H) 3.0 - 18 mmol/L Glucose 289 (H) 74 - 99 mg/dL BUN 43 (H) 7.0 - 18 MG/DL Creatinine 1.68 (H) 0.6 - 1.3 MG/DL  
 BUN/Creatinine ratio 26 (H) 12 - 20 GFR est AA 50 (L) >60 ml/min/1.73m2 GFR est non-AA 41 (L) >60 ml/min/1.73m2 Calcium 6.0 (L) 8.5 - 10.1 MG/DL Bilirubin, total 0.4 0.2 - 1.0 MG/DL  
 ALT (SGPT) 997 (H) 16 - 61 U/L  
 AST (SGOT) 1,178 (H) 15 - 37 U/L Alk. phosphatase 37 (L) 45 - 117 U/L Protein, total 3.5 (L) 6.4 - 8.2 g/dL Albumin 1.8 (L) 3.4 - 5.0 g/dL Globulin 1.7 (L) 2.0 - 4.0 g/dL A-G Ratio 1.1 0.8 - 1.7 MAGNESIUM Collection Time: 06/20/19 11:38 AM  
Result Value Ref Range Magnesium 2.4 1.6 - 2.6 mg/dL PHOSPHORUS Collection Time: 06/20/19 11:38 AM  
Result Value Ref Range Phosphorus 9.3 (H) 2.5 - 4.9 MG/DL  
ECHO ADULT FOLLOW-UP OR LIMITED Collection Time: 06/20/19 12:27 PM  
Result Value Ref Range LVIDd 4.21 4.2 - 5.9 cm  
 LVPWd 1.31 (A) 0.6 - 1.0 cm LVIDs 3.49 cm IVSd 1.23 (A) 0.6 - 1.0 cm  
 LV ED Vol A2C 86.2 mL  
 LV ES Vol A4C 63.3 mL  
 LV ES Vol BP 63.0 (A) 22 - 58 mL  
 BP EF 26.8 (A) 55 - 100 % LV Ejection Fraction MOD 4C 25 % LV Ejection Fraction MOD 2C 26 % LV Mass .1 (A) 88 - 224 g LV Mass AL Index 115.8 49 - 115 g/m2 LV ES Vol A2C 64.1 mL  
 LVES Vol Index BP 31.8 mL/m2 LV ED Vol A4C 84.0 mL  
 LVED Vol Index BP 43.5 mL/m2 LV ED Vol BP 86.1 67 - 155 ml  
 LVED Vol Index A4C 42.5 mL/m2 LVED Vol Index A2C 43.6 mL/m2 LVES Vol Index A4C 32.0 mL/m2 LVES Vol Index A2C 32.4 mL/m2 CALCIUM, IONIZED Collection Time: 06/20/19  2:18 PM  
Result Value Ref Range Ionized Calcium 0.81 (L) 1.12 - 1.32 MMOL/L  
POC G3 Collection Time: 06/20/19  4:48 PM  
Result Value Ref Range Device: VENT    
 FIO2 (POC) 100 % pH (POC) 7.028 (LL) 7.35 - 7.45    
 pCO2 (POC) 38.0 35.0 - 45.0 MMHG  
 pO2 (POC) 69 (L) 80 - 100 MMHG  
 HCO3 (POC) 10.3 (L) 22 - 26 MMOL/L  
 sO2 (POC) 86 (L) 92 - 97 % Base deficit (POC) 21 mmol/L Mode ASSIST CONTROL Allens test (POC) N/A Site DRAWN FROM ARTERIAL LINE Patient temp. 95.7 Specimen type (POC) ARTERIAL Performed by Johnnie GAONA, ALLOCATE Collection Time: 06/20/19  5:15 PM  
Result Value Ref Range Unit number [id number] Blood component type FP 24h,Thaw2 Unit division 00 Status of unit ISSUED Unit number [id number] Blood component type FP 24h,Thaw2 Unit division 00 Status of unit TRANSFUSED   
GLUCOSE, POC Collection Time: 06/20/19  6:08 PM  
Result Value Ref Range Glucose (POC) 360 (H) 70 - 110 mg/dL CARDIAC PANEL,(CK, CKMB & TROPONIN) Collection Time: 06/20/19 11:15 PM  
Result Value Ref Range CK 4,106 (H) 39 - 308 U/L  
 CK - .7 (H) <3.6 ng/ml CK-MB Index 10.8 (H) 0.0 - 4.0 % Troponin-I, QT >200.00 (HH) 0.0 - 0.045 NG/ML  
LACTIC ACID Collection Time: 06/20/19 11:15 PM  
Result Value Ref Range Lactic acid 15.3 (HH) 0.4 - 2.0 MMOL/L  
HGB & HCT Collection Time: 06/20/19 11:15 PM  
Result Value Ref Range HGB 11.4 (L) 13.0 - 16.0 g/dL HCT 35.0 (L) 36.0 - 48.0 % POC G3 Collection Time: 06/20/19 11:19 PM  
Result Value Ref Range Device: VENT    
 FIO2 (POC) 100 % pH (POC) 7.118 (LL) 7.35 - 7.45    
 pCO2 (POC) 38.6 35.0 - 45.0 MMHG  
 pO2 (POC) 179 (H) 80 - 100 MMHG  
 HCO3 (POC) 12.3 (L) 22 - 26 MMOL/L  
 sO2 (POC) 99 (H) 92 - 97 % Base deficit (POC) 17 mmol/L Mode ASSIST CONTROL Tidal volume 450 ml Set Rate 24 bpm  
 PEEP/CPAP (POC) 8 cmH2O Allens test (POC) NO Inspiratory Time 1.1 sec Total resp. rate 24 Site DRAWN FROM ARTERIAL LINE Patient temp. 38.0 Specimen type (POC) ARTERIAL Performed by Sherman Savers Volume control plus YES    
GLUCOSE, POC Collection Time: 06/21/19  1:03 AM  
Result Value Ref Range Glucose (POC) 414 (HH) 70 - 110 mg/dL GLUCOSE, POC Collection Time: 06/21/19  3:00 AM  
Result Value Ref Range Glucose (POC) 423 (HH) 70 - 110 mg/dL CALCIUM, IONIZED Collection Time: 06/21/19  4:31 AM  
Result Value Ref Range Ionized Calcium 0.85 (L) 1.12 - 1.32 MMOL/L  
HEPATIC FUNCTION PANEL Collection Time: 06/21/19  4:31 AM  
Result Value Ref Range Protein, total 4.8 (L) 6.4 - 8.2 g/dL Albumin 2.7 (L) 3.4 - 5.0 g/dL Globulin 2.1 2.0 - 4.0 g/dL A-G Ratio 1.3 0.8 - 1.7 Bilirubin, total 0.8 0.2 - 1.0 MG/DL Bilirubin, direct 0.3 (H) 0.0 - 0.2 MG/DL Alk. phosphatase 65 45 - 117 U/L  
 AST (SGOT) 9,638 (H) 15 - 37 U/L  
 ALT (SGPT) 4,841 (H) 16 - 61 U/L  
METABOLIC PANEL, BASIC Collection Time: 06/21/19  4:31 AM  
Result Value Ref Range Sodium 139 136 - 145 mmol/L Potassium 4.8 3.5 - 5.5 mmol/L Chloride 99 (L) 100 - 108 mmol/L  
 CO2 14 (L) 21 - 32 mmol/L Anion gap 26 (H) 3.0 - 18 mmol/L Glucose 426 (HH) 74 - 99 mg/dL BUN 55 (H) 7.0 - 18 MG/DL Creatinine 3.78 (H) 0.6 - 1.3 MG/DL  
 BUN/Creatinine ratio 15 12 - 20 GFR est AA 20 (L) >60 ml/min/1.73m2 GFR est non-AA 16 (L) >60 ml/min/1.73m2 Calcium 6.7 (L) 8.5 - 10.1 MG/DL  
CBC WITH AUTOMATED DIFF Collection Time: 06/21/19  4:31 AM  
Result Value Ref Range WBC 21.6 (H) 4.6 - 13.2 K/uL  
 RBC 3.50 (L) 4.70 - 5.50 M/uL  
 HGB 10.3 (L) 13.0 - 16.0 g/dL HCT 31.9 (L) 36.0 - 48.0 %  MCV 91.1 74.0 - 97.0 FL  
 MCH 29.4 24.0 - 34.0 PG  
 MCHC 32.3 31.0 - 37.0 g/dL  
 RDW 16.3 (H) 11.6 - 14.5 % PLATELET 72 (L) 112 - 420 K/uL MPV 13.0 (H) 9.2 - 11.8 FL  
 NEUTROPHILS 78 (H) 42 - 75 % BAND NEUTROPHILS 13 (H) 0 - 5 % LYMPHOCYTES 6 (L) 20 - 51 % MONOCYTES 3 2 - 9 % EOSINOPHILS 0 0 - 5 % BASOPHILS 0 0 - 3 %  
 ABS. NEUTROPHILS 16.8 (H) 1.8 - 8.0 K/UL  
 ABS. LYMPHOCYTES 1.3 0.8 - 3.5 K/UL  
 ABS. MONOCYTES 0.6 0 - 1.0 K/UL  
 ABS. EOSINOPHILS 0.0 0.0 - 0.4 K/UL  
 ABS. BASOPHILS 0.0 0.0 - 0.1 K/UL PLATELET COMMENTS 1+ LARGE PLATELETS   
 RBC COMMENTS ANISOCYTOSIS 1+ 
    
 RBC COMMENTS POLYCHROMASIA 2+ 
    
 DF MANUAL    
LACTIC ACID Collection Time: 06/21/19  4:31 AM  
Result Value Ref Range Lactic acid 17.0 (HH) 0.4 - 2.0 MMOL/L  
PROTHROMBIN TIME + INR Collection Time: 06/21/19  4:31 AM  
Result Value Ref Range Prothrombin time 29.2 (H) 11.5 - 15.2 sec INR 2.7 (H) 0.8 - 1.2 PTT Collection Time: 06/21/19  4:31 AM  
Result Value Ref Range aPTT 49.6 (H) 23.0 - 36.4 SEC HEMOGLOBIN A1C WITH EAG Collection Time: 06/21/19  4:31 AM  
Result Value Ref Range Hemoglobin A1c 6.1 (H) 4.2 - 5.6 % Est. average glucose 128 mg/dL MAGNESIUM Collection Time: 06/21/19  4:31 AM  
Result Value Ref Range Magnesium 2.4 1.6 - 2.6 mg/dL PHOSPHORUS Collection Time: 06/21/19  4:31 AM  
Result Value Ref Range Phosphorus 9.0 (H) 2.5 - 4.9 MG/DL  
POC G3 Collection Time: 06/21/19  5:24 AM  
Result Value Ref Range Device: VENT    
 FIO2 (POC) 100 % pH (POC) 7.103 (LL) 7.35 - 7.45    
 pCO2 (POC) 32.8 (L) 35.0 - 45.0 MMHG  
 pO2 (POC) 98 80 - 100 MMHG  
 HCO3 (POC) 10.3 (L) 22 - 26 MMOL/L  
 sO2 (POC) 95 92 - 97 % Base deficit (POC) 19 mmol/L Mode ASSIST CONTROL Tidal volume 450 ml Set Rate 24 bpm  
 PEEP/CPAP (POC) 5 cmH2O Allens test (POC) NO Inspiratory Time 1.2 sec Total resp. rate 24 Site DRAWN FROM ARTERIAL LINE Patient temp. 36.6 Specimen type (POC) ARTERIAL Performed by Dora Delvalle Volume control plus YES    
GLUCOSE, POC Collection Time: 06/21/19  6:30 AM  
Result Value Ref Range Glucose (POC) 503 (HH) 70 - 110 mg/dL Telemetry: sinus mechanism Assessment:  
 
Active Problems: 
  NSTEMI (non-ST elevated myocardial infarction) ([address] (6/20/2019) Acute respiratory failure with hypoxemia ([address] (6/20/2019) Shock, cardiogenic ([address] (6/20/2019) Coagulopathy ([address] (6/20/2019) Gastrointestinal hemorrhage with hematemesis (6/20/2019) Cardiomyopathy ([address] (6/20/2019) CAD (coronary artery disease) (6/20/2019) Lactic acidosis (6/20/2019) Anemia due to GI blood loss (6/20/2019) Metabolic acidosis (3/20/7254) Systolic CHF, acute on chronic ([address] (6/20/2019) Cardiac arrest ([address] (6/20/2019) Anoxic encephalopathy ([address] (6/21/2019) Acute renal failure ([address] (6/21/2019) Shock liver (6/21/2019) Plan:  
 
Measures per Critical Care and Hospital Medicine. Neurology consultant reports brain death.   
 
Ida Woodard MD

## 2019-06-21 NOTE — H&P
Hospitalist Progress Note Patient: Zenia Tello MRN: [id number]  CSN: [id number] YOB: 1955  Age: 58 y.o. Sex: male DOA: 6/20/2019 LOS:  LOS: 1 day Chief Complaint: brain dead Assessment/Plan Disposition : 
Patient Active Problem List  
Diagnosis Code  NSTEMI (non-ST elevated myocardial infarction) ([facility] [address] I21.4  Acute respiratory failure with hypoxemia (HCC) J96.01  Shock, cardiogenic ([address] R57.0  Coagulopathy ([address] D68.9  
 Gastrointestinal hemorrhage with hematemesis K92.0  Cardiomyopathy ([address] I42.9  CAD (coronary artery disease) I25.10  Lactic acidosis E87.2  Anemia due to GI blood loss D50.0  Metabolic acidosis N22.1  Systolic CHF, acute on chronic (HCC) I50.23  Cardiac arrest ([address] I46.9  Anoxic encephalopathy (HCC) G93.1  Acute renal failure (HCC) N17.9  Shock liver K72.00 Complicated case. Suspected to be  brain dead. Likely futile case. Recommend withdrawal of care and comfort measures. Subjective: 
 
Declared brain dead. Review of systems: 
 
 
 
 
Vital signs/Intake and Output: 
Visit Vitals /57 Pulse (!) 103 Temp 97.5 °F (36.4 °C) Resp 24 Ht 5' 6\" (1.676 m) Wt 92.5 kg (203 lb 13.4 oz) SpO2 94% BMI 32.90 kg/m² Current Shift:  06/21 0701 - 06/21 1900 In: 4882.9 [I.V.:4882.9] Out: - Last three shifts:  06/19 1901 - 06/21 0700 In: 2714.8 [I.V.:1574.8] Out: 900 [Urine:900] Exam: 
 
General: Intubated Head/Neck: ET tube CVS:Regular rate and rhythm, no M/R/G, S1/S2 heard, no thrill Lungs:Clear to auscultation bilaterally, no wheezes, rhonchi, or rales Abdomen: Soft, bs+ Extremities: Some edema. Labs: Results:  
   
Chemistry Recent Labs  
  06/21/19 
0431 06/20/19 
1138 06/20/19 
0405 * 289* 234*  146* 135* K 4.8 4.2 4.7 CL 99* 107 101 CO2 14* 15* 16* BUN 55* 43* 50* CREA 3.78* 1.68* 1.21  
CA 6.7* 6.0* 8.4*  
 AGAP 26* 24* 18  
BUCR 15 26* 41* AP 65 37* 44*  
TP 4.8* 3.5* 6.2* ALB 2.7* 1.8* 3.4 GLOB 2.1 1.7* 2.8 AGRAT 1.3 1.1 1.2  
  
CBC w/Diff Recent Labs  
  06/21/19 
0431 06/20/19 
2315 06/20/19 
1138 06/20/19 
5107 WBC 21.6*  --  20.3* 21.5*  
RBC 3.50*  --  3.36* 2.36* HGB 10.3* 11.4* 9.9*  9.8* 6.4* HCT 31.9* 35.0* 30.7*  30.6* 20.6* PLT 72*  --  64* 206 GRANS 78*  --  75 78* LYMPH 6*  --  16* 18* EOS 0  --  0 0 Cardiac Enzymes Recent Labs  
  06/20/19 
2315 06/20/19 
1138 CPK 4,106* 432* CKND1 10.8* 7.1* Coagulation Recent Labs  
  06/21/19 
0431 06/20/19 
1138 PTP 29.2* 20.0* INR 2.7* 1.7* APTT 49.6* 58.5* Lipid Panel No results found for: CHOL, CHOLPOCT, CHOLX, CHLST, CHOLV, 072343, HDL, LDL, LDLC, DLDLP, 839781, VLDLC, VLDL, TGLX, TRIGL, TRIGP, TGLPOCT, CHHD, CHHDX  
BNP No results for input(s): BNPP in the last 72 hours. Liver Enzymes Recent Labs  
  06/21/19 
0431 TP 4.8* ALB 2.7* AP 65 SGOT 9,638* Thyroid Studies No results found for: T4, T3U, TSH, TSHEXT Procedures/imaging: see electronic medical records for all procedures/Xrays and details which were not copied into this note but were reviewed prior to creation of Plan Gurpreet Millan MD

## 2019-06-21 NOTE — PROCEDURES
ELECTROENCEPHALOGRAPHY Patient: Honey Bautista MRN: [id number]  CSN: [id number] YOB: 1955  Age: 58 y.o. Sex: male DOA: 6/20/2019 LOS:  LOS: 1 day Date of Service: 06/21/2019 DICTATING: Chi Pereira MD  
 
REFERRING PHYSICIAN: Dr. Darnell Guevara ELECTROENCEPHALOGRAM NUMBER: [id number] HISTORY OF PRESENT ILLNESS: This is a 58year old man, who presented with PEA arrest. He is comatose. ELECTROENCEPHALOGRAM INTERPRETATION: This is a referential and bipolar EEG recorded with a 10-20 system. EEG background is severely depressed, showing low amplitude pulsation artifacts. There is background rhythm of wakefulness or sleep. Photic stimulation or external stimulation does not produce an abnormal activity. There is no spontaneous activity or variability of EEG activity. There are no epileptiform discharges or electrographic seizures in the study. IMPRESSION: This EEG is severely abnormal due to severely depressed cortical activity, which indicates severe anoxic brain injury. There are no ictal or interictal findings with a specific correlation with seizures. Signed: 
Chi Pereira MD 
6/21/2019 12:55 PM

## 2019-06-22 NOTE — PROGRESS NOTES
DEATH NOTE: 
 
RN paged for asystole on telemetry monitor at 0234. Examined patient and confirmed absence of heart sounds for one minute. Pupils fixed and dilated and absent corneal reflex. Time of death 12.   
 
Abner Garcia MD 
Nocturnist

## 2019-06-22 NOTE — PROGRESS NOTES
1905 - Bedside report obtained. Assumed care of patient. Drip rates verified with Surjit Ingram RN.  
 
2000 - Shift assessment completed at this time. Patient unresponsive. Pupils fixed and dilated. No corneal reflex noted. No upper or lower extremity response to any stimulus. No gag reflex noted with ET tube suction. 0030 - Reassessment completed. No changes to previous assessment. 0234 - Rhythm asystole. No pulse. Family at bedside. Hospitalist paged to pronounce patient.  paged. 12 - Dr Soumya Trejo at bedside. Informed family at bedside of death. Family wishes to sit with patient at this time, does not want wish for Vent or IVs to be removed at this time, will notify staff when they are ready. Nidia called with notice of death. Patient is not a candidate for organ/tissue donation. Awaiting [facility] eye bank coordinator to call. 7257 - Nursing supervisor made aware of patient death. 80 -  at bedside speaking with family at this time. 65 - Family gave permission to remove vent and IV lines at this time. 0600 - Family ready for patient to go to [facility] at this time. Patient wiped down and placed in post mortem bag.  
 
0630 - Patient transported to [address] [place] [address] by this nurse and security team. Nursing supervisor made aware.

## 2019-06-22 NOTE — PROGRESS NOTES
Bereavement Note: 
 
 responded to the death of Perla Sinha, who is a 58 y.o., male, offering Spiritual Care to patient and family, see flow sheets for interventions. Date of Death: 2019 Extended Emergency Contact Information Primary Emergency Contact: Ayala Garcia Mobile Phone: [phone] Relation: Son 
Secondary Emergency Contact: Jennifer Hicks Mobile Phone: [phone] Relation: Spouse YES      NO  UNKNOWN Life Net   []        [x]    [] Eye Bank   [] [x] [] Medical Examiner  []        [x]  [] Going to [place]  [x]        [] [] Autopsy   []        [x]         [] Sympathy Card  []        [x] Bereavement Materials  [x]        [] Business Card Provided  [x]        []  Home: To Be Determined. Spouse and Son are going to contact local [employer] for information on burial procedures and information on burial grounds this morning.  provided family with contact numbers for [facility] Chaplain[facility] and Nursing Supervisor in order for them to share this information with [facility], for the release of Patient's body. Chaplains will continue to follow family and will provide spiritual care as needed. Lisbeth Chanelin [facility]/Alejandro Amaro/Kirstin [phone]

## 2019-06-22 NOTE — DISCHARGE SUMMARY
Discharge Summary Patient: Arlyn Bobby MRN: [id number]  CSN: [id number] YOB: 1955  Age: 58 y.o. Sex: male DOA: 2019 LOS:  LOS: 2 days   Discharge Date:   
 
Primary Care Provider:  Janelle Dickerson MD 
 
Admission Diagnoses: STEMI (ST elevation myocardial infarction) ([facility] [I21.3] Discharge Diagnoses:   
Problem List as of 2019 Never Reviewed Codes Class Noted - Resolved Anoxic encephalopathy ([facility] ICD-10-CM: G93.1 ICD-9-CM: 348.1  2019 - Present Acute renal failure (HCC) ICD-10-CM: N17.9 ICD-9-CM: 584.9  2019 - Present Shock liver ICD-10-CM: K72.00 ICD-9-CM: 382  2019 - Present NSTEMI (non-ST elevated myocardial infarction) ([facility] ICD-10-CM: I21.4 ICD-9-CM: 410.70  2019 - Present Acute respiratory failure with hypoxemia [facility]) ICD-10-CM: J96.01 
ICD-9-CM: 518.81  2019 - Present Shock, cardiogenic ([place] [address].) ICD-10-CM: R57.0 ICD-9-CM: 785.51  2019 - Present Coagulopathy ([facility] ICD-10-CM: F80.1 ICD-9-CM: 286.9  2019 - Present Gastrointestinal hemorrhage with hematemesis ICD-10-CM: K92.0 ICD-9-CM: 578.0  2019 - Present Cardiomyopathy ([facility] ICD-10-CM: I42.9 ICD-9-CM: 425.4  2019 - Present CAD (coronary artery disease) ICD-10-CM: I25.10 ICD-9-CM: 414.00  2019 - Present Lactic acidosis ICD-10-CM: E87.2 ICD-9-CM: 276.2  2019 - Present Anemia due to GI blood loss ICD-10-CM: D50.0 ICD-9-CM: 280.0  2019 - Present Metabolic acidosis DTU-58-JM: E87.2 ICD-9-CM: 276.2  2019 - Present Systolic CHF, acute on chronic (HCC) ICD-10-CM: C68.04 ICD-9-CM: 428.23, 428.0  2019 - Present Cardiac arrest [facility]) ICD-10-CM: I46.9 ICD-9-CM: 427.5  2019 - Present Discharge Medications: There are no discharge medications for this patient. Discharge Condition:  Procedures : central line placement (femoral) Consults: Cardiology, Gastroenterology, Neurology PHYSICAL EXAM  
Visit Vitals BP (!) 74/54 Pulse 60 Temp 96.1 °F (35.6 °C) Resp 24 Ht 5' 6\" (1.676 m) Wt 92.5 kg (203 lb 13.4 oz) SpO2 94% BMI 32.90 kg/m² No cardiac sounds x1 min, pupils fixed and dilated, absent corneal reflex. Admission HPI : Deborah Ghotra is a 58 y.o. male who has past history of CAD, CABG, CHF, DM, HTN, CVA, thrombocytopenia presented to ER with concerns of N/V, SOB, chest pain, dizziness. History is obtained from chart as patient is orally intubated and sedated. Most of the history obtained from chart and speaking to son. Patient was in ER and work up was being done. He complained of abdominal pain and was getting CT. When he returned to ER bed he was more SOB and hypotensive and required to be intubated and started on pressors. His wbc elevated at 21, initial H/H 8/25.3 and follow up H/H 6.4/20. 6. ABG -  6.953/28.7/118/6.3/95. Initial troponin 0.21 and repeat at 0.75. Code blue called on this patient for bradycardia and subsequent PEA. Patient  had CABG x 2 in 1994 at [facility] in [place]. His cardiologist is Dr. Elizabeth Lee at [facility]. 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 CHF with EF of 35%. Son reports that patient had few MI that he did not seek medical attention. He was a smoker until 11 and relapsed in 2008 until CVA in 2015. Son not sure if he has been completely abstained from smoking now. \" Hospital Course : Mr. Tatiana Ghotra was admitted for a massive GI bleed which led to cardiac arrest in the emergency room. He was transferred to the ICU and despite being on max doses of levophed, epinephrine, neosynephrine, and dopamine, he continued to be bradycardic and progressively deteriorated into asystole.  Earlier in the day his son and wife had changed his code status to DNR. His son and wife were at the bedside at the time of death. Disposition:  Minutes spent on discharge: 30 minutes Labs: Results:  
   
Chemistry Recent Labs  
  19 
1138 19 
0405 * 289* 234*  146* 135* K 4.8 4.2 4.7 CL 99* 107 101 CO2 14* 15* 16* BUN 55* 43* 50* CREA 3.78* 1.68* 1.21  
CA 6.7* 6.0* 8.4* AGAP 26* 24* 18  
BUCR 15 26* 41* AP 65 37* 44*  
TP 4.8* 3.5* 6.2* ALB 2.7* 1.8* 3.4 GLOB 2.1 1.7* 2.8 AGRAT 1.3 1.1 1.2  
  
CBC w/Diff Recent Labs  
  198 19 
6594 WBC 21.6*  --  20.3* 21.5*  
RBC 3.50*  --  3.36* 2.36* HGB 10.3* 11.4* 9.9*  9.8* 6.4* HCT 31.9* 35.0* 30.7*  30.6* 20.6* PLT 72*  --  64* 206 GRANS 78*  --  75 78* LYMPH 6*  --  16* 18* EOS 0  --  0 0 Cardiac Enzymes Recent Labs  
  19 
1138 CPK 4,106* 432* CKND1 10.8* 7.1* Coagulation Recent Labs  
  19 
1138 PTP 29.2* 20.0* INR 2.7* 1.7* APTT 49.6* 58.5* Lipid Panel No results found for: CHOL, CHOLPOCT, CHOLX, CHLST, CHOLV, 130340, HDL, LDL, LDLC, DLDLP, 099619, VLDLC, VLDL, TGLX, TRIGL, TRIGP, TGLPOCT, CHHD, CHHDX  
BNP No results for input(s): BNPP in the last 72 hours. Liver Enzymes Recent Labs  
  06/21/19 
0431 TP 4.8* ALB 2.7* AP 65 SGOT 9,638* Thyroid Studies No results found for: T4, T3U, TSH, TSHEXT Significant Diagnostic Studies: Xr Abd (kub) Result Date: 2019 EXAM: XR ABD (KUB) INDICATION: OG insertion COMPARISON: None. FINDINGS: A supine radiograph of the abdomen shows a normal bowel gas pattern. Enteric tube tip in stomach. No soft tissue masses or pathologic calcifications are identified. The bones and soft tissues are within normal limits. IMPRESSION: Enteric tube tip in stomach. Unremarkable otherwise. Ct Abd Pelv W Cont Result Date: 2019 EXAM: CT ABDOMEN AND PELVIS IV ONLY INDICATION: Abdominal pain , nausea and vomiting, dizziness COMPARISON: No prior comparison study TECHNIQUE: Axial CT imaging of the abdomen and pelvis was performed after the administration of only IV contrast as per specific clinician request. Multiplanar reformats were generated. One or more dose reduction techniques were used on this CT: automated exposure control, adjustment of the mAs and/or kVp according to patient's size, and iterative reconstruction techniques. The specific techniques utilized on this CT exam have been documented in the patient's electronic medical record. Digital Imaging and Communications in Medicine (DICOM) format image data are available to nonaffiliated external healthcare facilities or entities on a secure, media free, reciprocally searchable basis with patient authorization for at least a 12-month period after this study. _______________ FINDINGS: Motion artifact is present which limits evaluation. LOWER CHEST: Mild dependent atelectasis is present in the lung bases. Partially visualized median sternotomy wires are present. There is no pericardial or pleural effusion. LIVER, BILIARY: The liver has diffusely decreased attenuation consistent with hepatic steatosis. No abnormal biliary dilation. Gallbladder is unremarkable. PANCREAS: Fatty atrophic changes are seen in the pancreas. SPLEEN: Unremarkable. ADRENALS: Unremarkable. KIDNEYS: Unremarkable. LYMPH NODES: No enlarged lymph nodes by size criteria. GASTROINTESTINAL TRACT: The lack of oral contrast limits bowel evaluation. No abnormal bowel dilation or wall thickening. Appendix is within normal limits. PELVIC ORGANS: Unremarkable. No free fluid or fluid collection is seen. VASCULATURE: Atherosclerotic calcifications are present. OSSEOUS: Degenerative changes are seen in the spine, particularly involving the lower lumbar facet joints. OTHER: None. _______________ IMPRESSION: 1. No acute solid organ or bowel abnormality. 2. Hepatic steatosis. Xr Chest [facility] ER Result Date: 6/21/2019 A portable AP radiograph of the chest was obtained at 0644 hours: History:  Intubated with respiratory failure. Comparison:  Portable chest 0757 hours previous day. FINDINGS:  Lines and tubes: ET tube, NG/OG tube and right-sided PICC line again seen stable. There is what appears to be an esophageal monitor. Heart and mediastinum: Borderline heart size with previous median sternotomy. Lungs and pleura: Hazy changes seen throughout both perihilar regions developing since the prior study. Hypoinflation. Aorta: Unremarkable. Bones: Within normal limits for age. Other: None IMPRESSION: Lines and tubes as described without pneumothorax. Perihilar hazy changes compatible with infiltrates or asymmetric pulmonary edema. Xr Chest [facility] ER Result Date: 6/20/2019 EXAM: Portable frontal view of the chest. CLINICAL INDICATION/HISTORY: Dizziness, hypertension, heart failure COMPARISON: None. _______________ FINDINGS: Previous median sternotomy with mediastinal clips. Normal mediastinal and cardiac silhouettes. Lungs are clear with no pleural effusion. _______________ IMPRESSION: 1. No evidence of active cardiopulmonary disease. Xr Chest [facility] ER Result Date: 6/20/2019 EXAM: One-view chest CLINICAL HISTORY: Respiratory distress , COMPARISON: None FINDINGS: Frontal view of the chest demonstrate low lung volumes. ET tube tip approximately 6 3 cm from the [place]. Enteric tube tip in the stomach. Right approach central line tip in the lower SVC near the atrial caval junction. . Cardiac silhouette is normal in size and contour. No acute bony or soft tissue abnormality. IMPRESSION: Line/support tubes in place as described. Low lung volumes. No acute pulmonary process identified. No results found for this or any previous visit.  
 
 
 
CC: Martina Newton MD

## 2019-06-24 LAB
BACTERIA SPEC CULT: ABNORMAL
BACTERIA SPEC CULT: ABNORMAL
GRAM STN SPEC: ABNORMAL
SERVICE CMNT-IMP: ABNORMAL

## 2019-06-26 LAB
BACTERIA SPEC CULT: NORMAL
SERVICE CMNT-IMP: NORMAL
